# Patient Record
Sex: MALE | Race: OTHER
[De-identification: names, ages, dates, MRNs, and addresses within clinical notes are randomized per-mention and may not be internally consistent; named-entity substitution may affect disease eponyms.]

---

## 2019-03-14 ENCOUNTER — HOSPITAL ENCOUNTER (INPATIENT)
Dept: HOSPITAL 62 - ER | Age: 49
LOS: 18 days | Discharge: HOME HEALTH | DRG: 240 | End: 2019-04-01
Attending: STUDENT IN AN ORGANIZED HEALTH CARE EDUCATION/TRAINING PROGRAM | Admitting: STUDENT IN AN ORGANIZED HEALTH CARE EDUCATION/TRAINING PROGRAM
Payer: SELF-PAY

## 2019-03-14 DIAGNOSIS — D50.0: ICD-10-CM

## 2019-03-14 DIAGNOSIS — B95.1: ICD-10-CM

## 2019-03-14 DIAGNOSIS — E11.52: Primary | ICD-10-CM

## 2019-03-14 DIAGNOSIS — D72.829: ICD-10-CM

## 2019-03-14 DIAGNOSIS — M86.172: ICD-10-CM

## 2019-03-14 DIAGNOSIS — E11.69: ICD-10-CM

## 2019-03-14 DIAGNOSIS — L97.524: ICD-10-CM

## 2019-03-14 DIAGNOSIS — E11.65: ICD-10-CM

## 2019-03-14 DIAGNOSIS — I96: ICD-10-CM

## 2019-03-14 DIAGNOSIS — I10: ICD-10-CM

## 2019-03-14 DIAGNOSIS — E11.621: ICD-10-CM

## 2019-03-14 DIAGNOSIS — Z91.14: ICD-10-CM

## 2019-03-14 DIAGNOSIS — B95.7: ICD-10-CM

## 2019-03-14 DIAGNOSIS — Z79.4: ICD-10-CM

## 2019-03-14 DIAGNOSIS — F10.20: ICD-10-CM

## 2019-03-14 DIAGNOSIS — E87.1: ICD-10-CM

## 2019-03-14 LAB
ABSOLUTE LYMPHOCYTES# (MANUAL): 0.9 10^3/UL (ref 0.5–4.7)
ABSOLUTE MONOCYTES # (MANUAL): 0.7 10^3/UL (ref 0.1–1.4)
ABSOLUTE NEUTROPHILS# (MANUAL): 20.5 10^3/UL (ref 1.7–8.2)
ADD MANUAL DIFF: YES
ALBUMIN SERPL-MCNC: 3 G/DL (ref 3.5–5)
ALP SERPL-CCNC: 138 U/L (ref 38–126)
ALT SERPL-CCNC: 15 U/L (ref 21–72)
ANION GAP SERPL CALC-SCNC: 14 MMOL/L (ref 5–19)
APPEARANCE UR: CLEAR
APTT PPP: YELLOW S
AST SERPL-CCNC: 21 U/L (ref 17–59)
BASOPHILS NFR BLD MANUAL: 0 % (ref 0–2)
BILIRUB DIRECT SERPL-MCNC: 0.8 MG/DL (ref 0–0.4)
BILIRUB SERPL-MCNC: 1.1 MG/DL (ref 0.2–1.3)
BILIRUB UR QL STRIP: NEGATIVE
BUN SERPL-MCNC: 17 MG/DL (ref 7–20)
CALCIUM: 9 MG/DL (ref 8.4–10.2)
CHLORIDE SERPL-SCNC: 87 MMOL/L (ref 98–107)
CK MB SERPL-MCNC: 0.34 NG/ML (ref ?–4.55)
CK SERPL-CCNC: 28 U/L (ref 55–170)
CO2 SERPL-SCNC: 27 MMOL/L (ref 22–30)
EOSINOPHIL NFR BLD MANUAL: 0 % (ref 0–6)
ERYTHROCYTE [DISTWIDTH] IN BLOOD BY AUTOMATED COUNT: 12.2 % (ref 11.5–14)
ETHANOL SERPL-MCNC: < 10 MG/DL
GLUCOSE SERPL-MCNC: 317 MG/DL (ref 75–110)
GLUCOSE UR STRIP-MCNC: >=500 MG/DL
HCT VFR BLD CALC: 36.9 % (ref 37.9–51)
HGB BLD-MCNC: 12.9 G/DL (ref 13.5–17)
KETONES UR STRIP-MCNC: 80 MG/DL
MACROCYTES BLD QL SMEAR: (no result)
MCH RBC QN AUTO: 35.3 PG (ref 27–33.4)
MCHC RBC AUTO-ENTMCNC: 34.9 G/DL (ref 32–36)
MCV RBC AUTO: 101 FL (ref 80–97)
MONOCYTES % (MANUAL): 3 % (ref 3–13)
NITRITE UR QL STRIP: NEGATIVE
PH UR STRIP: 6 [PH] (ref 5–9)
PLATELET # BLD: 364 10^3/UL (ref 150–450)
PLATELET COMMENT: ADEQUATE
POTASSIUM SERPL-SCNC: 4.4 MMOL/L (ref 3.6–5)
PROT SERPL-MCNC: 6.8 G/DL (ref 6.3–8.2)
PROT UR STRIP-MCNC: 30 MG/DL
RBC # BLD AUTO: 3.65 10^6/UL (ref 4.35–5.55)
SEGMENTED NEUTROPHILS % (MAN): 93 % (ref 42–78)
SODIUM SERPL-SCNC: 128.2 MMOL/L (ref 137–145)
SP GR UR STRIP: 1.02
TOTAL CELLS COUNTED BLD: 100
TOXIC GRANULES BLD QL SMEAR: (no result)
TROPONIN I SERPL-MCNC: < 0.012 NG/ML
UROBILINOGEN UR-MCNC: 2 MG/DL (ref ?–2)
VARIANT LYMPHS NFR BLD MANUAL: 4 % (ref 13–45)
WBC # BLD AUTO: 22 10^3/UL (ref 4–10.5)

## 2019-03-14 PROCEDURE — 0Y6U0Z0 DETACHMENT AT LEFT 3RD TOE, COMPLETE, OPEN APPROACH: ICD-10-PCS | Performed by: SURGERY

## 2019-03-14 PROCEDURE — 85027 COMPLETE CBC AUTOMATED: CPT

## 2019-03-14 PROCEDURE — 87070 CULTURE OTHR SPECIMN AEROBIC: CPT

## 2019-03-14 PROCEDURE — 81001 URINALYSIS AUTO W/SCOPE: CPT

## 2019-03-14 PROCEDURE — 80076 HEPATIC FUNCTION PANEL: CPT

## 2019-03-14 PROCEDURE — 82550 ASSAY OF CK (CPK): CPT

## 2019-03-14 PROCEDURE — 71046 X-RAY EXAM CHEST 2 VIEWS: CPT

## 2019-03-14 PROCEDURE — 96375 TX/PRO/DX INJ NEW DRUG ADDON: CPT

## 2019-03-14 PROCEDURE — 0Y6S0Z0 DETACHMENT AT LEFT 2ND TOE, COMPLETE, OPEN APPROACH: ICD-10-PCS | Performed by: SURGERY

## 2019-03-14 PROCEDURE — 88305 TISSUE EXAM BY PATHOLOGIST: CPT

## 2019-03-14 PROCEDURE — 87077 CULTURE AEROBIC IDENTIFY: CPT

## 2019-03-14 PROCEDURE — 87186 SC STD MICRODIL/AGAR DIL: CPT

## 2019-03-14 PROCEDURE — 82140 ASSAY OF AMMONIA: CPT

## 2019-03-14 PROCEDURE — 0Y6W0Z0 DETACHMENT AT LEFT 4TH TOE, COMPLETE, OPEN APPROACH: ICD-10-PCS | Performed by: SURGERY

## 2019-03-14 PROCEDURE — 93005 ELECTROCARDIOGRAM TRACING: CPT

## 2019-03-14 PROCEDURE — 80202 ASSAY OF VANCOMYCIN: CPT

## 2019-03-14 PROCEDURE — 36415 COLL VENOUS BLD VENIPUNCTURE: CPT

## 2019-03-14 PROCEDURE — 83036 HEMOGLOBIN GLYCOSYLATED A1C: CPT

## 2019-03-14 PROCEDURE — 87075 CULTR BACTERIA EXCEPT BLOOD: CPT

## 2019-03-14 PROCEDURE — 88311 DECALCIFY TISSUE: CPT

## 2019-03-14 PROCEDURE — 93010 ELECTROCARDIOGRAM REPORT: CPT

## 2019-03-14 PROCEDURE — 80053 COMPREHEN METABOLIC PANEL: CPT

## 2019-03-14 PROCEDURE — 83735 ASSAY OF MAGNESIUM: CPT

## 2019-03-14 PROCEDURE — 01480 ANES OPEN PX LOWER L/A/F NOS: CPT

## 2019-03-14 PROCEDURE — 83605 ASSAY OF LACTIC ACID: CPT

## 2019-03-14 PROCEDURE — 80307 DRUG TEST PRSMV CHEM ANLYZR: CPT

## 2019-03-14 PROCEDURE — 84484 ASSAY OF TROPONIN QUANT: CPT

## 2019-03-14 PROCEDURE — 80069 RENAL FUNCTION PANEL: CPT

## 2019-03-14 PROCEDURE — 93971 EXTREMITY STUDY: CPT

## 2019-03-14 PROCEDURE — 93926 LOWER EXTREMITY STUDY: CPT

## 2019-03-14 PROCEDURE — 82553 CREATINE MB FRACTION: CPT

## 2019-03-14 PROCEDURE — A6266 IMPREG GAUZE NO H20/SAL/YARD: HCPCS

## 2019-03-14 PROCEDURE — 80048 BASIC METABOLIC PNL TOTAL CA: CPT

## 2019-03-14 PROCEDURE — 85025 COMPLETE CBC W/AUTO DIFF WBC: CPT

## 2019-03-14 PROCEDURE — 87040 BLOOD CULTURE FOR BACTERIA: CPT

## 2019-03-14 PROCEDURE — 96365 THER/PROPH/DIAG IV INF INIT: CPT

## 2019-03-14 PROCEDURE — 88307 TISSUE EXAM BY PATHOLOGIST: CPT

## 2019-03-14 PROCEDURE — 99285 EMERGENCY DEPT VISIT HI MDM: CPT

## 2019-03-14 PROCEDURE — 87205 SMEAR GRAM STAIN: CPT

## 2019-03-14 PROCEDURE — 82962 GLUCOSE BLOOD TEST: CPT

## 2019-03-14 PROCEDURE — 96361 HYDRATE IV INFUSION ADD-ON: CPT

## 2019-03-14 RX ADMIN — FAMOTIDINE SCH MG: 20 TABLET, FILM COATED ORAL at 23:48

## 2019-03-14 RX ADMIN — ENOXAPARIN SODIUM SCH: 40 INJECTION SUBCUTANEOUS at 18:08

## 2019-03-14 RX ADMIN — PIPERACILLIN AND TAZOBACTAM SCH MLS/HR: 4; .5 INJECTION, POWDER, LYOPHILIZED, FOR SOLUTION INTRAVENOUS; PARENTERAL at 18:46

## 2019-03-14 RX ADMIN — KETOROLAC TROMETHAMINE PRN MG: 30 INJECTION, SOLUTION INTRAMUSCULAR at 20:34

## 2019-03-14 RX ADMIN — DIAZEPAM SCH: 5 TABLET ORAL at 18:08

## 2019-03-14 RX ADMIN — DIAZEPAM SCH MG: 5 TABLET ORAL at 23:48

## 2019-03-14 RX ADMIN — SODIUM CHLORIDE, SODIUM LACTATE, POTASSIUM CHLORIDE, AND CALCIUM CHLORIDE PRN MLS/HR: .6; .31; .03; .02 INJECTION, SOLUTION INTRAVENOUS at 13:33

## 2019-03-14 RX ADMIN — OXYCODONE AND ACETAMINOPHEN PRN TAB: 5; 325 TABLET ORAL at 20:33

## 2019-03-14 RX ADMIN — VANCOMYCIN HYDROCHLORIDE SCH MLS/HR: 1 INJECTION, POWDER, LYOPHILIZED, FOR SOLUTION INTRAVENOUS at 23:48

## 2019-03-14 NOTE — PDOC H&P
History of Present Illness


Admission Date/PCP: 


  





  NO LOCALMD





History of Present Illness: 


BRIANDA SNOW is a 48 year old male male patient with past medical history 

of type 2 diabetes mellitus, hypertension, alcoholism presents to ED for left 

foot pain and ulceration.  Patient brought by friends and family members.  

Reportedly patient has left foot ulcer which has been going on for the last 6 

weeks.  Patient denies any fever chills palpitation or diaphoresis.  Patient is 

a known alcoholic and he drinks on daily basis.  Patient is also noncompliant 

with his medication and his hemoglobin A1c 5 years ago was 10.8.  Upon my 

evaluation the patient's left foot is swollen erythematous and the second and 

third toe are gangrenous.  There is also a wound on the metatarsal area.  Dr. Kendall consulted by the ER attending to evaluate the patient in for possible 

incision drainage and total debridement.  I think patient possibly may need 

amputation of the gangrenous toe.








Past Medical History


Cardiac Medical History: Reports: Hypertension





Social History


Smoking Status: Never Smoker





- Advance Directive


Resuscitation Status: Full Code





Family History


Parental Family History Reviewed: Yes


Children Family History Reviewed: Yes


Sibling(s) Family History Reviewed.: Yes





Medication/Allergy


Allergies/Adverse Reactions: 


                                        





No Known Allergies Allergy (Verified 03/14/19 10:15)


   











Review of Systems


Constitutional: ABSENT: chills, fever(s), headache(s), weight gain, weight loss


Eyes: ABSENT: visual disturbances


Ears: ABSENT: hearing changes


Cardiovascular: ABSENT: chest pain, dyspnea on exertion, edema, orthropnea, 

palpitations


Respiratory: ABSENT: cough, hemoptysis


Gastrointestinal: ABSENT: abdominal pain, constipation, diarrhea, hematemesis, 

hematochezia, nausea, vomiting


Genitourinary: ABSENT: dysuria, hematuria


Musculoskeletal: ABSENT: joint swelling


Integumentary: ABSENT: rash, wounds


Neurological: ABSENT: abnormal gait, abnormal speech, confusion, dizziness, fo

devon weakness, syncope


Psychiatric: ABSENT: anxiety, depression, homidical ideation, suicidal ideation


Endocrine: ABSENT: cold intolerance, heat intolerance, polydipsia, polyuria


Hematologic/Lymphatic: ABSENT: easy bleeding, easy bruising





Physical Exam


Vital Signs: 


                                        











Temp Pulse Resp BP Pulse Ox


 


 98.1 F   107 H  16   130/90 H  99 


 


 03/14/19 10:15  03/14/19 10:15  03/14/19 10:15  03/14/19 10:15  03/14/19 10:15








                                 Intake & Output











 03/13/19 03/14/19 03/15/19





 06:59 06:59 06:59


 


Weight   70.3 kg











General appearance: PRESENT: no acute distress


Head exam: PRESENT: atraumatic, normocephalic


Mouth exam: PRESENT: moist


Neck exam: ABSENT: carotid bruit, JVD, lymphadenopathy, thyromegaly


Respiratory exam: PRESENT: clear to auscultation lorenzo.  ABSENT: rales, rhonchi, 

wheezes


Cardiovascular exam: PRESENT: RRR.  ABSENT: diastolic murmur, rubs, systolic 

murmur


Extremities exam: PRESENT: other - The left foot is erythematous swollen and 

macerated with gangrenous





Results


Laboratory Results: 


                                        





                                 03/14/19 11:00 





                                 03/14/19 11:00 





                                        











  03/14/19 03/14/19 03/14/19





  11:00 11:00 12:10


 


WBC  22.0 H  


 


RBC  3.65 L  


 


Hgb  12.9 L  


 


Hct  36.9 L  


 


MCV  101 H  


 


MCH  35.3 H  


 


MCHC  34.9  


 


RDW  12.2  


 


Plt Count  364  


 


Seg Neutrophils %  Not Reportable  


 


Lymphocytes %  Not Reportable  


 


Monocytes %  Not Reportable  


 


Eosinophils %  Not Reportable  


 


Basophils %  Not Reportable  


 


Absolute Neutrophils  Not Reportable  


 


Absolute Lymphocytes  Not Reportable  


 


Absolute Monocytes  Not Reportable  


 


Absolute Eosinophils  Not Reportable  


 


Absolute Basophils  Not Reportable  


 


Sodium   128.2 L 


 


Potassium   4.4 


 


Chloride   87 L 


 


Carbon Dioxide   27 


 


Anion Gap   14 


 


BUN   17 


 


Creatinine   0.83 


 


Est GFR ( Amer)   > 60 


 


Est GFR (Non-Af Amer)   > 60 


 


Glucose   317 H 


 


Lactic Acid    1.6


 


Calcium   9.0 


 


Total Bilirubin   1.1 


 


AST   21 


 


ALT   15 L 


 


Alkaline Phosphatase   138 H 


 


Total Protein   6.8 


 


Albumin   3.0 L 


 


Urine Color   


 


Urine Appearance   


 


Urine pH   


 


Ur Specific Gravity   


 


Urine Protein   


 


Urine Glucose (UA)   


 


Urine Ketones   


 


Urine Blood   


 


Urine Nitrite   


 


Ur Leukocyte Esterase   


 


Urine WBC (Auto)   


 


Urine RBC (Auto)   














  03/14/19





  12:10


 


WBC 


 


RBC 


 


Hgb 


 


Hct 


 


MCV 


 


MCH 


 


MCHC 


 


RDW 


 


Plt Count 


 


Seg Neutrophils % 


 


Lymphocytes % 


 


Monocytes % 


 


Eosinophils % 


 


Basophils % 


 


Absolute Neutrophils 


 


Absolute Lymphocytes 


 


Absolute Monocytes 


 


Absolute Eosinophils 


 


Absolute Basophils 


 


Sodium 


 


Potassium 


 


Chloride 


 


Carbon Dioxide 


 


Anion Gap 


 


BUN 


 


Creatinine 


 


Est GFR ( Amer) 


 


Est GFR (Non-Af Amer) 


 


Glucose 


 


Lactic Acid 


 


Calcium 


 


Total Bilirubin 


 


AST 


 


ALT 


 


Alkaline Phosphatase 


 


Total Protein 


 


Albumin 


 


Urine Color  YELLOW


 


Urine Appearance  CLEAR


 


Urine pH  6.0


 


Ur Specific Gravity  1.021


 


Urine Protein  30 H


 


Urine Glucose (UA)  >=500 H


 


Urine Ketones  80 H


 


Urine Blood  SMALL H


 


Urine Nitrite  NEGATIVE


 


Ur Leukocyte Esterase  TRACE H


 


Urine WBC (Auto)  6


 


Urine RBC (Auto)  5








                                        











  03/14/19 03/14/19





  11:00 11:00


 


Creatine Kinase  28 L 


 


CK-MB (CK-2)   0.34


 


Troponin I   < 0.012











Impressions: 


                                        





Chest X-Ray  03/14/19 11:26


IMPRESSION:  DIFFUSE INTERSTITIAL SCARRING.  NO ACUTE RADIOGRAPHIC FINDING IN 

THE CHEST.


 








Foot X-Ray  03/14/19 11:28


IMPRESSION:  SUPERFICIAL SOFT TISSUE ULCERATION ON THE LATERAL IMAGE.  ON THE AP

AND OBLIQUE IMAGES THERE IS PATCHY OSTEOPENIA AND INDISTINCT APPEARANCE OF THE 

CORTEX AT THE BASE OF THE PROXIMAL PHALANX OF THE 3RD AND 4TH TOE.  SIMILAR 

APPEARANCE INVOLVING THE HEAD OF THE 3RD AND 4TH METATARSALS AND POSSIBLY THE 

2ND.  FINDINGS SUSPICIOUS FOR OSTEOMYELITIS.  FURTHER EVALUATION WITH MRI OF THE

FOOT MAY BE INDICATED.


 














Assessment & Plan





- Diagnosis


(1) Left infected gangrenous foot


Is this a current diagnosis for this admission?: Yes   


Plan: 


Second and third left toe looks it has with gangrene.


Dr. Kendall consulted.


We will keep him on IV vancomycin and Zosyn.








(2) Leukocytosis


Is this a current diagnosis for this admission?: Yes   


Plan: 


Due to #1.  Will monitor CBC.








(3) Uncontrolled diabetes mellitus


Qualifiers: 


   Diabetes mellitus type: type 2 


Is this a current diagnosis for this admission?: Yes   


Plan: 


We will start him on Lantus and sliding scale.








(4) Alcoholism /alcohol abuse


Is this a current diagnosis for this admission?: Yes   


Plan: 


I will put him on alcohol withdrawal protocol.








(5) Hypertension


Qualifiers: 


   Hypertension type: essential hypertension   Qualified Code(s): I10 - 

Essential (primary) hypertension   


Is this a current diagnosis for this admission?: Yes   


Plan: 


I will start him on metoprolol








- Inpatient Certification


Medical Necessity: Need Close Monitoring Due to Risk of Patient Decompensation, 

Need for IV Antibiotics

## 2019-03-14 NOTE — OPERATIVE REPORT
Operative Report


DATE OF SURGERY: 03/14/19


PREOPERATIVE DIAGNOSIS: Septic diabetic left foot with ischemic toes 2 and 3


POSTOPERATIVE DIAGNOSIS: Same with aggressive soft tissue infection, 

osteomyelitis of toes 2 3 and 4, with extension of infection to the dorsum of 

the left foot


OPERATION: Aggressive debridement of left foot including amputations of toes 2 3

and 4, metatarsal heads of 2 3 and 4, counterincision dorsum of left foot with 

Penrose loop drain insertion


SURGEON: BEHZAD PEDRO


ANESTHESIA: GA


TISSUE REMOVED OR ALTERED: Toes 2, 3, 4, skin, fascia, tendons, bone chunks


COMPLICATIONS: 





None


ESTIMATED BLOOD LOSS: 25 cc


INTRAOPERATIVE FINDINGS: See below


PROCEDURE: 





The patient was taken to the operating where general anesthesia was induced.  

Left foot was then isolated, prepped and draped sterile fashion.





Surgical plan surgical timeout were conducted.





The foot was significant for obvious infection, and ischemia involving toes 2 3 

and 4 on the left side, with extension to the plantar surface metatarsal areas 2

and 3, and the dorsum of the midfoot.  The fourth toe was considered marginal 

status.





Using a 10 blade, the skin and subcutaneous tissue, fascia, and webspace of the 

toes 2 and 3 incised.  Toes 2 and 3 were removed using bone cutter.  

Visualization of the medial aspect of the fourth toe confirmed its non-viability

so the fourth toe was amputated as well.  Metatarsal heads were taken down with 

a large bone rondure in the second, third and fourth metatarsal positions.  A 

counterincision was made on the dorsum of the foot the mid foot-out of tarsal 

area, longitudinally oriented.  More pus was evacuated.  Of note pus was noted 

around toes 2 3 as well as the plantar surface of the foot; cultures were sent 

for Gram stain and sensitivity.





A large Penrose drain was then placed between the counterincision and the 

initial amputation incision.  I used a Ella clamp to break up any loculations 

under the skin flaps around the counterincision.





The lateral left fifth metatarsal area was bruised but not ischemic.  I probed 

this area with Ella clamp and I was not able to introduce a pocket therefore it

was left as is.





I now irrigated the wounds with 3 L of pulse lavage irrigation, normal saline.  

Small bleeders likely digital vessels cauterized as encountered.





At this point I felt that I had done a sufficient level of aggressive damage 

control debridement of this threatened forefoot.  Several feet of half inch 

iodoform packing was inserted into the open wound, and then a 4 x 4's Kerlix and

Ace wrap applied.  Patient taught procedure well, extubated, and taken to 

recovery in stable condition.

## 2019-03-14 NOTE — RADIOLOGY REPORT (SQ)
EXAM DESCRIPTION:  CHEST 2 VIEWS



COMPLETED DATE/TIME:  3/14/2019 12:06 pm



REASON FOR STUDY:  left lower leg swelling with erythema/ulceration



COMPARISON:  5/28/2010.



EXAM PARAMETERS:  NUMBER OF VIEWS: two views

TECHNIQUE: Digital Frontal and Lateral radiographic views of the chest acquired.

RADIATION DOSE: NA

LIMITATIONS: none



FINDINGS:  LUNGS AND PLEURA: Diffuse interstitial changes are again seen and slightly more prominent.
  No lobar infiltrates, masses or pneumothorax. No pleural effusion.

MEDIASTINUM AND HILAR STRUCTURES: No masses or contour abnormalities.

HEART AND VASCULAR STRUCTURES: Heart normal size.  No evidence for failure.

BONES: No acute findings.

HARDWARE: None in the chest.

OTHER: No other significant finding.



IMPRESSION:  DIFFUSE INTERSTITIAL SCARRING.  NO ACUTE RADIOGRAPHIC FINDING IN THE CHEST.



TECHNICAL DOCUMENTATION:  JOB ID:  [id number]

 2011 [employer]- All Rights Reserved



Reading location - IP/workstation name: [email or web address]

## 2019-03-14 NOTE — XCELERA REPORT
[address] [place]

                             [facility] [address]

                               Tel: [phone]

                               Fax: [phone]



                       Lower Extremity Venous Evaluation

_______________________________________________________________________________



_______________________________________________________________________________

Procedure: Color flow and duplex imaging of the veins of the left lower

extremity as well as the right Common Femoral vein.





_______________________________________________________________________________



_______________________________________________________________________________



Right Sided Venous Evaluation

The right common femoral vein is fully compressible. Spontaneous and phasic

flow is present in the right common femoral vein.



Left Sided Venous Evaluation

Normal vessel filling wall to wall, compression and augmentation as well as

Colour flow down to the infrageniculate veins.



_______________________________________________________________________________



Interpretation Summary

No duplex evidence of DVT or obstruction in the left lower extremity nor in

the right Common Femoral vein.

_______________________________________________________________________________



Name: BRIANDA SNOW

MRN: [id number]

Age: 48 yrs

Gender: Male                                      : 1970

Patient Status: Inpatient                         Patient Location:

[place] [address]A

Account #: [id number]

Study Date: 2019 12:40 PM

                          Accession #: [id number]

Reason For Study: eft lower leg swelling with erythema/ulceration

Ordering Physician: ZACARIAS MONTANO

Performed By: Shakira Lockhart

Electronically signed by:      Lennox Williams      on 2019 02:22 PM



CC: ZACARIAS MONTANO

>

Williams, Lennox

## 2019-03-14 NOTE — ER DOCUMENT REPORT
ED General





- General


Chief Complaint: Foot Pain


Stated Complaint: FOOT PAIN


Mode of Arrival: Ambulatory


Information source: Patient, Friend


TRAVEL OUTSIDE OF THE [place] IN LAST 30 DAYS: No





- HPI


Notes: 





48-year-old male who is a noncompliant diabetic presents to the ED with a friend

for evaluation of his left foot that he is concerned about infection due to 

ulcerated sores, black discoloration and severe pain.  Patient primarily speaks 

Kyrgyz but does understand English and able to speak English.  patient and 

family friend states that his sores on his foot started roughly 6 weeks ago, 

patient had complained about them but he did not feel any pain.  Reports a sore 

started a sore on the bottom of his left foot that has increased in size with 

noted swelling along with sores between his left toes.  family friend is with 

patient, currently at bedside, called Dr. Velazquez, local family medicine physician,

who advised him to come to the emergency room considering his last blood work 

which was over 5 years ago was A1c was 10.8 and his blood sugar was 398.   

Family friend states that patient has been drinking heavily in the last couple 

of years.  Patient states that he no longer ambulate on his own.  denies fevers,

chills,  chest pain,palpitations,  shortness of breath, dyspnea, nausea, 

vomiting, diarrhea, abdominal pain, hematuria,LH, dizziness, syncope, headaches,

URI, neck pain, weakness, bowel or bladder dysfunction, saddle anesthesia, 

muscle paralysis, weakness in bilateral upper or lower extremities equally or 

rash.





- Related Data


Allergies/Adverse Reactions: 


                                        





No Known Allergies Allergy (Verified 03/14/19 10:15)


   











Past Medical History





- General


Information source: Patient





- Social History


Smoking Status: Never Smoker


Chew tobacco use (# tins/day): No


Frequency of alcohol use: Occasional


Drug Abuse: None


Family History: Reviewed & Not Pertinent


Patient has suicidal ideation: No


Patient has homicidal ideation: No





- Past Medical History


Cardiac Medical History: Reports: Hx Hypertension


Renal/ Medical History: Denies: Hx Peritoneal Dialysis





Review of Systems





- Review of Systems


Constitutional: See HPI


EENT: No symptoms reported


Cardiovascular: No symptoms reported


Respiratory: No symptoms reported


Gastrointestinal: No symptoms reported


Genitourinary: No symptoms reported


Male Genitourinary: No symptoms reported


Musculoskeletal: See HPI


Skin: See HPI


Hematologic/Lymphatic: No symptoms reported


Neurological/Psychological: No symptoms reported





Physical Exam





- Vital signs


Vitals: 


                                        











Temp Pulse Resp BP Pulse Ox


 


 98.1 F   107 H  16   130/90 H  99 


 


 03/14/19 10:15  03/14/19 10:15  03/14/19 10:15  03/14/19 10:15  03/14/19 10:15














- Notes


Notes: 





PHYSICAL EXAMINATION:





GENERAL: Well-appearing, well-nourished and in no acute distress.





HEAD: Atraumatic, normocephalic.





EYES: Pupils equal round and reactive to light, extraocular movements intact, 

sclera anicteric, conjunctiva are normal.





ENT: Nares patent, oropharynx clear without exudates.  Moist mucous membranes.





NECK: Normal range of motion, supple without lymphadenopathy





LUNGS: Breath sounds clear to auscultation bilaterally and equal.  No wheezes 

rales or rhonchi.





HEART: Regular rate and rhythm without murmurs





ABDOMEN: Soft, nontender, nondistended abdomen.  No guarding, no rebound.  No ma

sses appreciated.





Musculoskeletal: Normal range of motion, no pitting or edema.  No cyanosis.





NEUROLOGICAL: Cranial nerves grossly intact.  Normal speech, normal gait.  

Normal sensory, motor exams 





PSYCH: Normal mood, normal affect.





SKIN: Warm, Dry, normal turgor, no rashes or lesions noted.  2nd and 3rd Left 

toes with black discoloration with swelling, erythema and warmth to touch on 

dorsal aspect of left foot with decubitus ulcer to proximal plantar aspect with 

non-blanching white discoloration.  unable to palpate distal pulses on the left,

able to palpate distal pulses on right.  able to wiggle toes on his left foot. 

negative squeeze test bilaterally, negative jonathan's test bilaterally. 





- General


General appearance: Alert





Course





- Re-evaluation


Re-evalutation: 





03/14/19 16:21


48-year-old male with afebrile slightly tachycardic and in moderate pain 

presents with friend for evaluation of foot swelling, ulceration and 

discoloration that was brought to friend's attention today, patient has been 

battling these sores per family friend for the last 6 weeks.  CBC shows a 

leukocytosis of 22 with was related brown like you likely do not think he has 

like there may come all the underlying no left shift, lactic is 1.6, sodium 128,

potassium, renal function within normal limits, slightly elevated alk phos which

is to be expected considering patient's diabetic foot ulcers and condition of 

the foot.  Discussed laboratory findings with Dr. Jeff Garrett, who advised 

to monitor pt and hyponatremia since he is asymptomatic.   Venous/arterial 

studies of left lower extremity negative for occlusion.  Patient started on IV 

vancomycin and Zosyn, IV fluids, patient given IV pain control.  X-ray left foot

showed osteomyelitis intermetatarsal joints, no gas noted.  Dr. Padmini clark, hospitalist, will admit patient to the surgical floor with surgical 

consult, Dr. Maynor Godwin, surgeon, at bedside at 1400, will bring patient 

to OR for likely amputation of the affected metatarsal joints, does not feel 

that an MRI is necessar prior.   Patient pain control has been managed.  On 

multiple re-evaluations patient has remained stable, vitals have been stable and

patient is in no distress.  Patient verbalized understanding of the plan that 

has before him of surgery and admission, all questions and concerns have been 

answered by this provider.  Patient will be admitted to hospitalist service with

surgical consult





- Vital Signs


Vital signs: 


                                        











Temp Pulse Resp BP Pulse Ox


 


 100.2 F   110 H  18   139/66 H  96 


 


 03/14/19 14:59  03/14/19 14:59  03/14/19 14:59  03/14/19 14:59  03/14/19 14:59














- Laboratory


Result Diagrams: 


                                 03/14/19 11:00





                                 03/14/19 11:00


Laboratory results interpreted by me: 


                                        











  03/14/19 03/14/19 03/14/19





  10:54 11:00 11:00


 


WBC   22.0 H 


 


RBC   3.65 L 


 


Hgb   12.9 L 


 


Hct   36.9 L 


 


MCV   101 H 


 


MCH   35.3 H 


 


Seg Neuts % (Manual)   93 H 


 


Lymphocytes % (Manual)   4 L 


 


Abs Neuts (Manual)   20.5 H 


 


Sodium    128.2 L


 


Chloride    87 L


 


Glucose    317 H


 


POC Glucose  330 H  


 


Direct Bilirubin    0.8 H


 


ALT    15 L


 


Alkaline Phosphatase    138 H


 


Ammonia   


 


Creatine Kinase    28 L


 


Albumin    3.0 L


 


Urine Protein   


 


Urine Glucose (UA)   


 


Urine Ketones   


 


Urine Blood   


 


Urine Urobilinogen   


 


Ur Leukocyte Esterase   














  03/14/19 03/14/19





  12:10 13:57


 


WBC  


 


RBC  


 


Hgb  


 


Hct  


 


MCV  


 


MCH  


 


Seg Neuts % (Manual)  


 


Lymphocytes % (Manual)  


 


Abs Neuts (Manual)  


 


Sodium  


 


Chloride  


 


Glucose  


 


POC Glucose  


 


Direct Bilirubin  


 


ALT  


 


Alkaline Phosphatase  


 


Ammonia   < 8.7 L


 


Creatine Kinase  


 


Albumin  


 


Urine Protein  30 H 


 


Urine Glucose (UA)  >=500 H 


 


Urine Ketones  80 H 


 


Urine Blood  SMALL H 


 


Urine Urobilinogen  2.0 H 


 


Ur Leukocyte Esterase  TRACE H 














Discharge





- Discharge


Clinical Impression: 


 Osteomyelitis of left foot, Leukocytosis, Left infected gangrenous foot, 

Alcoholism /alcohol abuse





Uncontrolled diabetes mellitus


Qualifiers:


 Diabetes mellitus type: type 2 





Condition: Stable


Disposition: ADMITTED AS INPATIENT


Admitting Provider: Hospitalist - Dr. Langford

## 2019-03-14 NOTE — XCELERA REPORT
[address] [address]

                               Tel: [phone]

                               Fax: [phone]



                      Lower Extremity Arterial Evaluation

_______________________________________________________________________________



Name: BRIANDA SNOW

MRN: [id number]                                   Age: 48 yrs

Gender: Male                                      : 1970

Patient Status: Inpatient                         Patient Location:

[place] [address]A

Account #: [id number]

Study Date: 2019 12:21 PM

                          Accession #: [id number]

_______________________________________________________________________________

Procedure: A color flow and duplex scan of the lower extremity arteries was

performed on the left with velocity and waveform anaylsis.

Reason For Study: necrotic foot ulcers, diminished pedal pulses LLE







Ordering Physician: ZACARIAS MONTANO

Performed By: Shakira Lockhart

_______________________________________________________________________________

_______________________________________________________________________________





Measurements and Calculations



                                     Right  Left

                     CFA PSV               184.6 cm/sec

                     Prox PFA PSV          -125.7cm/sec

                     Prox SFA PSV          -134.1cm/sec

                     Mid SFA PSV           -146.9cm/sec

                     Dist SFA PSV          -108.1cm/sec

                     Dist Pop A PSV        108.1 cm/sec

                     Dist JOHANA PSV          213.1 cm/sec

                     Dist PTA PSV          116.9 cm/sec

                     Tomer Pedis PSV         149.4 cm/sec



_______________________________________________________________________________



Left Side Arterial Evaluation

Normal velocity and triphasic waveforms in the Common Femoral artery. .

Biphasic with normal velocity with spectral broadening From Femoral to the

Posterior tibial. Monophasic with normal velocity, spectral broadening in the

Anterior Tibial artery.

Ankle Brachial index not obtained due to pain.



_______________________________________________________________________________

Interpretation Summary

Moderate hemodynamically significant lesions in the left lower extremity only,

on duplex imaging, at rest. Hemodynamic effects from the Femoral, sequential

changes in the Anterior Tibial.

_______________________________________________________________________________

Electronically signed by:      Lennox Williams      on 2019 02:27 PM



CC: ZACARIAS MONTANO

>

Williams, Lennox

## 2019-03-14 NOTE — RADIOLOGY REPORT (SQ)
EXAM DESCRIPTION:  FOOT LEFT COMPLETE



COMPLETED DATE/TIME:  3/14/2019 12:22 pm



REASON FOR STUDY:  ulceration to left foot



COMPARISON:  None.



NUMBER OF VIEWS:  Three views.



TECHNIQUE:  AP, lateral and oblique  radiographic images acquired of the left foot.



LIMITATIONS:  None.



FINDINGS:  MINERALIZATION: Normal.

BONES: No acute fracture or dislocation.  Patchy osteopenia at the base of the proximal phalanx of th
e 4th and 5th toe and also involving the head of the 3rd and 4th metatarsals and possibly the 2nd.  I
ndistinct appearance of the cortex.

JOINTS: Degenerative changes in the 1st metatarsal phalangeal joint.

SOFT TISSUES: On the lateral image there is evidence of a superficial soft tissue ulceration on the p
lantar surface at the base of the toes.  No foreign body.

OTHER: No other significant finding.



IMPRESSION:  SUPERFICIAL SOFT TISSUE ULCERATION ON THE LATERAL IMAGE.  ON THE AP AND OBLIQUE IMAGES T
HERE IS PATCHY OSTEOPENIA AND INDISTINCT APPEARANCE OF THE CORTEX AT THE BASE OF THE PROXIMAL PHALANX
 OF THE 3RD AND 4TH TOE.  SIMILAR APPEARANCE INVOLVING THE HEAD OF THE 3RD AND 4TH METATARSALS AND PO
SSIBLY THE 2ND.  FINDINGS SUSPICIOUS FOR OSTEOMYELITIS.  FURTHER EVALUATION WITH MRI OF THE FOOT MAY 
BE INDICATED.



TECHNICAL DOCUMENTATION:  JOB ID:  [id number]

 2011 [email or web address]- All Rights Reserved



Reading location - IP/workstation name: [email or web address]

## 2019-03-14 NOTE — PDOC CONSULTATION
Consultation


Consult Date: 03/14/19


Attending physician:: luís


Consult reason:: Septic left foot





History of Present Illness


Patient complains of: Foot pain


History of Present Illness: 


BRIANDA SNOW is a 48 year old male


Who presents emergency department via ground rescue complaining of several day 

history of a left foot pain, swelling, drainage.  He was told by his friend at 

work to seek medical care.  He is in the emergency department this morning with 

a septic appearing left foot.  X-rays show findings consistent with 

osteomyelitis and soft tissue ulceration.  Surgery was consulted, patient 

evaluated and found to have a threatened forefoot in need of urgent operative 

debridement.





Past Medical History


Past Medical History: 





Diabetes and alcoholism


Cardiac Medical History: Reports: Hypertension





Past Surgical History


Past Surgical History: 





None known





Social History


Smoking Status: Never Smoker





- Advance Directive


Resuscitation Status: Full Code





Family History


Family History: None


Parental Family History Reviewed: No


Children Family History Reviewed: No


Sibling(s) Family History Reviewed.: No





Medication/Allergy


Allergies/Adverse Reactions: 


                                        





No Known Allergies Allergy (Verified 03/14/19 10:15)


   











Review of Systems


ROS unobtainable: Other - Due to language barrier unable to complete thorough 

review of systems





Physical Exam


Vital Signs: 


                                        











Temp Pulse Resp BP Pulse Ox


 


 98.1 F   107 H  16   130/90 H  99 


 


 03/14/19 10:15  03/14/19 10:15  03/14/19 10:15  03/14/19 10:15  03/14/19 10:15








                                 Intake & Output











 03/13/19 03/14/19 03/15/19





 06:59 06:59 06:59


 


Weight   70.3 kg











General appearance: PRESENT: mild distress


Head exam: PRESENT: normocephalic


Eye exam: PRESENT: EOMI


Mouth exam: PRESENT: dry mucosa


Neck exam: PRESENT: full ROM


Respiratory exam: PRESENT: clear to auscultation lorenzo


Cardiovascular exam: PRESENT: RRR


Pulses: PRESENT: normal carotid pulses, normal radial pulses, normal femoral 

pulses, other - Unable to feel pulses in left foot


Vascular exam: PRESENT: other - Able to feel pulses in the right foot, posterior

tibial and dorsalis pedis


GI/Abdominal exam: PRESENT: soft


Rectal exam: PRESENT: deferred


Extremities exam: PRESENT: other - Grossly swollen erythematous left foot, with 

ischemic toes 2 and 3, mild edema toes 4 and 5 and 1; deep V-shaped necrotic 

area of skin on the plantar surface; left fourth mal perforans ulcer


Psychiatric exam: PRESENT: appropriate affect


Skin exam: PRESENT: dry





Results


Laboratory Results: 


                                        





                                 03/14/19 11:00 





                                 03/14/19 11:00 





                                        











  03/14/19 03/14/19 03/14/19





  11:00 11:00 12:10


 


WBC  22.0 H  


 


RBC  3.65 L  


 


Hgb  12.9 L  


 


Hct  36.9 L  


 


MCV  101 H  


 


MCH  35.3 H  


 


MCHC  34.9  


 


RDW  12.2  


 


Plt Count  364  


 


Seg Neutrophils %  Not Reportable  


 


Lymphocytes %  Not Reportable  


 


Monocytes %  Not Reportable  


 


Eosinophils %  Not Reportable  


 


Basophils %  Not Reportable  


 


Absolute Neutrophils  Not Reportable  


 


Absolute Lymphocytes  Not Reportable  


 


Absolute Monocytes  Not Reportable  


 


Absolute Eosinophils  Not Reportable  


 


Absolute Basophils  Not Reportable  


 


Sodium   128.2 L 


 


Potassium   4.4 


 


Chloride   87 L 


 


Carbon Dioxide   27 


 


Anion Gap   14 


 


BUN   17 


 


Creatinine   0.83 


 


Est GFR ( Amer)   > 60 


 


Est GFR (Non-Af Amer)   > 60 


 


Glucose   317 H 


 


Lactic Acid    1.6


 


Calcium   9.0 


 


Total Bilirubin   1.1 


 


AST   21 


 


ALT   15 L 


 


Alkaline Phosphatase   138 H 


 


Total Protein   6.8 


 


Albumin   3.0 L 


 


Urine Color   


 


Urine Appearance   


 


Urine pH   


 


Ur Specific Gravity   


 


Urine Protein   


 


Urine Glucose (UA)   


 


Urine Ketones   


 


Urine Blood   


 


Urine Nitrite   


 


Ur Leukocyte Esterase   


 


Urine WBC (Auto)   


 


Urine RBC (Auto)   














  03/14/19





  12:10


 


WBC 


 


RBC 


 


Hgb 


 


Hct 


 


MCV 


 


MCH 


 


MCHC 


 


RDW 


 


Plt Count 


 


Seg Neutrophils % 


 


Lymphocytes % 


 


Monocytes % 


 


Eosinophils % 


 


Basophils % 


 


Absolute Neutrophils 


 


Absolute Lymphocytes 


 


Absolute Monocytes 


 


Absolute Eosinophils 


 


Absolute Basophils 


 


Sodium 


 


Potassium 


 


Chloride 


 


Carbon Dioxide 


 


Anion Gap 


 


BUN 


 


Creatinine 


 


Est GFR ( Amer) 


 


Est GFR (Non-Af Amer) 


 


Glucose 


 


Lactic Acid 


 


Calcium 


 


Total Bilirubin 


 


AST 


 


ALT 


 


Alkaline Phosphatase 


 


Total Protein 


 


Albumin 


 


Urine Color  YELLOW


 


Urine Appearance  CLEAR


 


Urine pH  6.0


 


Ur Specific Gravity  1.021


 


Urine Protein  30 H


 


Urine Glucose (UA)  >=500 H


 


Urine Ketones  80 H


 


Urine Blood  SMALL H


 


Urine Nitrite  NEGATIVE


 


Ur Leukocyte Esterase  TRACE H


 


Urine WBC (Auto)  6


 


Urine RBC (Auto)  5








                                        











  03/14/19 03/14/19





  11:00 11:00


 


Creatine Kinase  28 L 


 


CK-MB (CK-2)   0.34


 


Troponin I   < 0.012











Impressions: 


                                        





Chest X-Ray  03/14/19 11:26


IMPRESSION:  DIFFUSE INTERSTITIAL SCARRING.  NO ACUTE RADIOGRAPHIC FINDING IN 

THE CHEST.


 








Foot X-Ray  03/14/19 11:28


IMPRESSION:  SUPERFICIAL SOFT TISSUE ULCERATION ON THE LATERAL IMAGE.  ON THE AP

AND OBLIQUE IMAGES THERE IS PATCHY OSTEOPENIA AND INDISTINCT APPEARANCE OF THE 

CORTEX AT THE BASE OF THE PROXIMAL PHALANX OF THE 3RD AND 4TH TOE.  SIMILAR 

APPEARANCE INVOLVING THE HEAD OF THE 3RD AND 4TH METATARSALS AND POSSIBLY THE 

2ND.  FINDINGS SUSPICIOUS FOR OSTEOMYELITIS.  FURTHER EVALUATION WITH MRI OF THE

FOOT MAY BE INDICATED.


 














Assessment & Plan





- Diagnosis


(1) Left infected gangrenous foot


Is this a current diagnosis for this admission?: Yes   


Plan: 


Impression: Threatened left foot due to septic diabetic soft tissue and likely 

bone infection, with left toes 2 and 3 nonviable








Recommendations:





1.  Take patient to the operating room for emergent debridement of left foot 

including left toes, and possibly portion of forefoot; subsequent operations may

be required; foot is in jeopardy.  I discussed this with the patient; I am 

uncertain as to his level of understanding





2.  Patient will be admitted to the hospital service with surgery consulting.  

Currently being delivered.








(2) Alcoholism /alcohol abuse


Is this a current diagnosis for this admission?: Yes   





(3) Hypertension


Qualifiers: 


   Hypertension type: essential hypertension   Qualified Code(s): I10 - 

Essential (primary) hypertension   


Is this a current diagnosis for this admission?: Yes   





(4) Uncontrolled diabetes mellitus


Qualifiers: 


   Diabetes mellitus type: type 2 


Is this a current diagnosis for this admission?: Yes   





- Time


Time Spent: 30 to 50 Minutes


Smoking Cessation Education: 3 to 10 minutes


Medications reviewed and adjusted accordingly: Yes


Anticipated discharge: Home





- Inpatient Certification


Based on my medical assessment, after consideration of the patient's c

omorbidities, presenting symptoms, or acuity I expect that the services needed 

warrant INPATIENT care.: Yes


I certify that my determination is in accordance with my understanding of 

Medicare's requirements for reasonable and necessary INPATIENT services [42 CFR 

412.3e].: Yes


Medical Necessity: Need For IV Fluids, Need for Pain Control, Need for IV 

Antibiotics, Need for Surgery

## 2019-03-15 LAB
ADD MANUAL DIFF: NO
ANION GAP SERPL CALC-SCNC: 13 MMOL/L (ref 5–19)
BASOPHILS # BLD AUTO: 0 10^3/UL (ref 0–0.2)
BASOPHILS NFR BLD AUTO: 0.2 % (ref 0–2)
BUN SERPL-MCNC: 17 MG/DL (ref 7–20)
CALCIUM: 8.5 MG/DL (ref 8.4–10.2)
CHLORIDE SERPL-SCNC: 93 MMOL/L (ref 98–107)
CO2 SERPL-SCNC: 28 MMOL/L (ref 22–30)
EOSINOPHIL # BLD AUTO: 0.1 10^3/UL (ref 0–0.6)
EOSINOPHIL NFR BLD AUTO: 0.3 % (ref 0–6)
ERYTHROCYTE [DISTWIDTH] IN BLOOD BY AUTOMATED COUNT: 12.5 % (ref 11.5–14)
GLUCOSE SERPL-MCNC: 248 MG/DL (ref 75–110)
HCT VFR BLD CALC: 34.8 % (ref 37.9–51)
HGB BLD-MCNC: 12 G/DL (ref 13.5–17)
LYMPHOCYTES # BLD AUTO: 1 10^3/UL (ref 0.5–4.7)
LYMPHOCYTES NFR BLD AUTO: 5.9 % (ref 13–45)
MCH RBC QN AUTO: 35 PG (ref 27–33.4)
MCHC RBC AUTO-ENTMCNC: 34.5 G/DL (ref 32–36)
MCV RBC AUTO: 101 FL (ref 80–97)
MONOCYTES # BLD AUTO: 1.2 10^3/UL (ref 0.1–1.4)
MONOCYTES NFR BLD AUTO: 7.3 % (ref 3–13)
NEUTROPHILS # BLD AUTO: 14.4 10^3/UL (ref 1.7–8.2)
NEUTS SEG NFR BLD AUTO: 86.3 % (ref 42–78)
PLATELET # BLD: 372 10^3/UL (ref 150–450)
POTASSIUM SERPL-SCNC: 4 MMOL/L (ref 3.6–5)
RBC # BLD AUTO: 3.44 10^6/UL (ref 4.35–5.55)
SODIUM SERPL-SCNC: 133.7 MMOL/L (ref 137–145)
TOTAL CELLS COUNTED % (AUTO): 100 %
WBC # BLD AUTO: 16.6 10^3/UL (ref 4–10.5)

## 2019-03-15 RX ADMIN — PIPERACILLIN AND TAZOBACTAM SCH MLS/HR: 4; .5 INJECTION, POWDER, LYOPHILIZED, FOR SOLUTION INTRAVENOUS; PARENTERAL at 12:02

## 2019-03-15 RX ADMIN — INSULIN HUMAN SCH UNIT: 100 INJECTION, SUSPENSION SUBCUTANEOUS at 17:25

## 2019-03-15 RX ADMIN — KETOROLAC TROMETHAMINE PRN MG: 30 INJECTION, SOLUTION INTRAMUSCULAR at 04:38

## 2019-03-15 RX ADMIN — ENOXAPARIN SODIUM SCH MG: 40 INJECTION SUBCUTANEOUS at 09:45

## 2019-03-15 RX ADMIN — FAMOTIDINE SCH MG: 20 TABLET, FILM COATED ORAL at 22:39

## 2019-03-15 RX ADMIN — OXYCODONE AND ACETAMINOPHEN PRN TAB: 5; 325 TABLET ORAL at 17:27

## 2019-03-15 RX ADMIN — VANCOMYCIN HYDROCHLORIDE SCH MLS/HR: 1 INJECTION, POWDER, LYOPHILIZED, FOR SOLUTION INTRAVENOUS at 09:47

## 2019-03-15 RX ADMIN — PIPERACILLIN AND TAZOBACTAM SCH MLS/HR: 4; .5 INJECTION, POWDER, LYOPHILIZED, FOR SOLUTION INTRAVENOUS; PARENTERAL at 05:47

## 2019-03-15 RX ADMIN — PIPERACILLIN AND TAZOBACTAM SCH MLS/HR: 4; .5 INJECTION, POWDER, LYOPHILIZED, FOR SOLUTION INTRAVENOUS; PARENTERAL at 17:25

## 2019-03-15 RX ADMIN — DIAZEPAM SCH MG: 5 TABLET ORAL at 13:26

## 2019-03-15 RX ADMIN — DIAZEPAM SCH MG: 5 TABLET ORAL at 05:47

## 2019-03-15 RX ADMIN — FAMOTIDINE SCH MG: 20 TABLET, FILM COATED ORAL at 09:46

## 2019-03-15 RX ADMIN — PIPERACILLIN AND TAZOBACTAM SCH MLS/HR: 4; .5 INJECTION, POWDER, LYOPHILIZED, FOR SOLUTION INTRAVENOUS; PARENTERAL at 01:36

## 2019-03-15 RX ADMIN — KETOROLAC TROMETHAMINE PRN MG: 10 TABLET, FILM COATED ORAL at 20:33

## 2019-03-15 RX ADMIN — INSULIN LISPRO SCH UNIT: 100 INJECTION, SOLUTION INTRAVENOUS; SUBCUTANEOUS at 17:25

## 2019-03-15 RX ADMIN — DIAZEPAM SCH MG: 5 TABLET ORAL at 22:38

## 2019-03-15 RX ADMIN — VANCOMYCIN HYDROCHLORIDE SCH MLS/HR: 1 INJECTION, POWDER, LYOPHILIZED, FOR SOLUTION INTRAVENOUS at 22:40

## 2019-03-15 RX ADMIN — INSULIN LISPRO SCH UNIT: 100 INJECTION, SOLUTION INTRAVENOUS; SUBCUTANEOUS at 22:39

## 2019-03-15 RX ADMIN — OXYCODONE AND ACETAMINOPHEN PRN TAB: 5; 325 TABLET ORAL at 04:37

## 2019-03-15 RX ADMIN — KETOROLAC TROMETHAMINE PRN MG: 30 INJECTION, SOLUTION INTRAMUSCULAR at 12:11

## 2019-03-15 NOTE — PDOC PROGRESS REPORT
Subjective


Progress Note for:: 03/15/19


Subjective:: 


BRIANDA SNOW is a 48 year old male male patient with past medical history 

of type 2 diabetes mellitus, hypertension, alcoholism presents to ED for left 

foot pain, infection and ulceration.  Wound and blood cultures are pending.  

Patient has been started empirically on vancomycin and Zosyn.  Yesterday Dr. Ojeda performed extensive aggressive debridement of left foot including 

amputation of toes second, third and fourth metatarsal heads.  Morning I seen navdeep valenzuela resting in bed and he reports his pain is relatively better than 

yesterday.  The surgical site is cleanly dressed no soaking or bleeding.





Reason For Visit: 


LEFT INFECTED WITH GANGRENOUS FOOT








Physical Exam


Vital Signs: 


                                        











Temp Pulse Resp BP Pulse Ox


 


 98.8 F   101 H  18   155/73 H  97 


 


 03/15/19 11:52  03/15/19 11:52  03/15/19 11:52  03/15/19 11:52  03/15/19 11:52








                                 Intake & Output











 03/14/19 03/15/19 03/16/19





 06:59 06:59 06:59


 


Intake Total  4925 450


 


Output Total  420 


 


Balance  4505 450


 


Weight  75.2 kg 75.2 kg











General appearance: PRESENT: no acute distress, well-developed, well-nourished


Head exam: PRESENT: atraumatic, normocephalic


Eye exam: PRESENT: conjunctiva pink, EOMI, PERRLA.  ABSENT: scleral icterus


Ear exam: PRESENT: normal external ear exam


Mouth exam: PRESENT: moist, tongue midline


Neck exam: ABSENT: carotid bruit, JVD, lymphadenopathy, thyromegaly


Respiratory exam: PRESENT: clear to auscultation lorenzo.  ABSENT: rales, rhonchi, 

wheezes


Cardiovascular exam: PRESENT: RRR.  ABSENT: diastolic murmur, rubs, systolic 

murmur


Pulses: PRESENT: normal dorsalis pedis pul


Vascular exam: PRESENT: normal capillary refill


GI/Abdominal exam: PRESENT: normal bowel sounds, soft.  ABSENT: distended, 

guarding, mass, organolmegaly, rebound, tenderness


Rectal exam: PRESENT: deferred


Extremities exam: PRESENT: other - Left foot is dressed.


Neurological exam: PRESENT: alert, awake, oriented to person, oriented to place,

oriented to time, oriented to situation, CN II-XII grossly intact.  ABSENT: 

motor sensory deficit


Psychiatric exam: PRESENT: appropriate affect, normal mood.  ABSENT: homicidal 

ideation, suicidal ideation


Skin exam: PRESENT: dry, intact, warm.  ABSENT: cyanosis, rash





Results


Laboratory Results: 


                                        





                                 03/15/19 07:50 





                                 03/15/19 07:50 





                                        











  03/14/19 03/15/19 03/15/19





  13:57 07:50 07:50


 


WBC   16.6 H 


 


RBC   3.44 L 


 


Hgb   12.0 L 


 


Hct   34.8 L 


 


MCV   101 H 


 


MCH   35.0 H 


 


MCHC   34.5 


 


RDW   12.5 


 


Plt Count   372 


 


Seg Neutrophils %   86.3 H 


 


Lymphocytes %   5.9 L 


 


Monocytes %   7.3 


 


Eosinophils %   0.3 


 


Basophils %   0.2 


 


Absolute Neutrophils   14.4 H 


 


Absolute Lymphocytes   1.0 


 


Absolute Monocytes   1.2 


 


Absolute Eosinophils   0.1 


 


Absolute Basophils   0.0 


 


Sodium    133.7 L


 


Potassium    4.0


 


Chloride    93 L


 


Carbon Dioxide    28


 


Anion Gap    13


 


BUN    17


 


Creatinine    0.85


 


Est GFR ( Amer)    > 60


 


Est GFR (Non-Af Amer)    > 60


 


Glucose    248 H


 


Calcium    8.5


 


Ammonia  < 8.7 L  








                                        











  03/14/19 03/14/19





  11:00 11:00


 


Creatine Kinase  28 L 


 


CK-MB (CK-2)   0.34


 


Troponin I   < 0.012











Impressions: 


                                        





Chest X-Ray  03/14/19 11:26


IMPRESSION:  DIFFUSE INTERSTITIAL SCARRING.  NO ACUTE RADIOGRAPHIC FINDING IN 

THE CHEST.


 








Foot X-Ray  03/14/19 11:28


IMPRESSION:  SUPERFICIAL SOFT TISSUE ULCERATION ON THE LATERAL IMAGE.  ON THE AP

AND OBLIQUE IMAGES THERE IS PATCHY OSTEOPENIA AND INDISTINCT APPEARANCE OF THE 

CORTEX AT THE BASE OF THE PROXIMAL PHALANX OF THE 3RD AND 4TH TOE.  SIMILAR 

APPEARANCE INVOLVING THE HEAD OF THE 3RD AND 4TH METATARSALS AND POSSIBLY THE 

2ND.  FINDINGS SUSPICIOUS FOR OSTEOMYELITIS.  FURTHER EVALUATION WITH MRI OF THE

FOOT MAY BE INDICATED.


 














Assessment & Plan





- Diagnosis


(1) Left infected gangrenous foot


Is this a current diagnosis for this admission?: Yes   


Plan: 


Second and third left toe looks it has with gangrene.


Dr. Kendall consulted.


We will keep him on IV vancomycin and Zosyn.








(2) Leukocytosis


Is this a current diagnosis for this admission?: Yes   


Plan: 


His white cell count trended down from 20,000-16,000.








(3) Uncontrolled diabetes mellitus


Qualifiers: 


   Diabetes mellitus type: type 2 


Is this a current diagnosis for this admission?: Yes   


Plan: 


Continue current regimen








(4) Alcoholism /alcohol abuse


Is this a current diagnosis for this admission?: Yes   


Plan: 


I will put him on alcohol withdrawal protocol.








(5) Hypertension


Qualifiers: 


   Hypertension type: essential hypertension   Qualified Code(s): I10 - 

Essential (primary) hypertension   


Is this a current diagnosis for this admission?: Yes   


Plan: 


I will start him on metoprolol

## 2019-03-15 NOTE — PDOC PROGRESS REPORT
Subjective


Progress Note for:: 03/15/19


Subjective:: 





pains operative site left foot


Reason For Visit: 


LEFT INFECTED WITH GANGRENOUS FOOT








Physical Exam


Vital Signs: 


                                        











Temp Pulse Resp BP Pulse Ox


 


 98.7 F   90   18   155/63 H  98 


 


 03/15/19 15:51  03/15/19 15:51  03/15/19 15:51  03/15/19 15:51  03/15/19 15:51








                                 Intake & Output











 03/14/19 03/15/19 03/16/19





 06:59 06:59 06:59


 


Intake Total  4925 1480


 


Output Total  420 


 


Balance  4505 1480


 


Weight  75.2 kg 75.2 kg











Exam: 





Dressings changed and repacked wound with .25 inch iodoform gauze. Left penrose 

drain that is looped to further up dorsum of foot.


Wound looks dry and tender. No abscess collection after removal and changed of 

dressing.





Results


Laboratory Results: 


                                        





                                 03/15/19 07:50 





                                 03/15/19 07:50 





                                        











  03/15/19 03/15/19





  07:50 07:50


 


WBC  16.6 H 


 


RBC  3.44 L 


 


Hgb  12.0 L 


 


Hct  34.8 L 


 


MCV  101 H 


 


MCH  35.0 H 


 


MCHC  34.5 


 


RDW  12.5 


 


Plt Count  372 


 


Seg Neutrophils %  86.3 H 


 


Lymphocytes %  5.9 L 


 


Monocytes %  7.3 


 


Eosinophils %  0.3 


 


Basophils %  0.2 


 


Absolute Neutrophils  14.4 H 


 


Absolute Lymphocytes  1.0 


 


Absolute Monocytes  1.2 


 


Absolute Eosinophils  0.1 


 


Absolute Basophils  0.0 


 


Sodium   133.7 L


 


Potassium   4.0


 


Chloride   93 L


 


Carbon Dioxide   28


 


Anion Gap   13


 


BUN   17


 


Creatinine   0.85


 


Est GFR ( Amer)   > 60


 


Est GFR (Non-Af Amer)   > 60


 


Glucose   248 H


 


Calcium   8.5








                                        











  03/14/19 03/14/19





  11:00 11:00


 


Creatine Kinase  28 L 


 


CK-MB (CK-2)   0.34


 


Troponin I   < 0.012











Impressions: 


                                        





Chest X-Ray  03/14/19 11:26


IMPRESSION:  DIFFUSE INTERSTITIAL SCARRING.  NO ACUTE RADIOGRAPHIC FINDING IN 

THE CHEST.


 








Foot X-Ray  03/14/19 11:28


IMPRESSION:  SUPERFICIAL SOFT TISSUE ULCERATION ON THE LATERAL IMAGE.  ON THE AP

AND OBLIQUE IMAGES THERE IS PATCHY OSTEOPENIA AND INDISTINCT APPEARANCE OF THE 

CORTEX AT THE BASE OF THE PROXIMAL PHALANX OF THE 3RD AND 4TH TOE.  SIMILAR 

APPEARANCE INVOLVING THE HEAD OF THE 3RD AND 4TH METATARSALS AND POSSIBLY THE 

2ND.  FINDINGS SUSPICIOUS FOR OSTEOMYELITIS.  FURTHER EVALUATION WITH MRI OF THE

FOOT MAY BE INDICATED.


 














Assessment & Plan





- Time


Time Spent with patient: 15-24 minutes





- Inpatient Certification


Medical Necessity: Need for IV Antibiotics, Risk of Complication if Not Cared 

For in Hospital





- Plan Summary


Plan Summary: 





Continue IV antibiotics


Re-evaluate in am and see if candidate for a wound vac.

## 2019-03-16 LAB
ADD MANUAL DIFF: NO
ANION GAP SERPL CALC-SCNC: 10 MMOL/L (ref 5–19)
BASOPHILS # BLD AUTO: 0.1 10^3/UL (ref 0–0.2)
BASOPHILS NFR BLD AUTO: 1 % (ref 0–2)
BUN SERPL-MCNC: 14 MG/DL (ref 7–20)
CALCIUM: 8.4 MG/DL (ref 8.4–10.2)
CHLORIDE SERPL-SCNC: 97 MMOL/L (ref 98–107)
CO2 SERPL-SCNC: 26 MMOL/L (ref 22–30)
EOSINOPHIL # BLD AUTO: 0.1 10^3/UL (ref 0–0.6)
EOSINOPHIL NFR BLD AUTO: 0.6 % (ref 0–6)
ERYTHROCYTE [DISTWIDTH] IN BLOOD BY AUTOMATED COUNT: 12.7 % (ref 11.5–14)
GLUCOSE SERPL-MCNC: 190 MG/DL (ref 75–110)
HCT VFR BLD CALC: 31.9 % (ref 37.9–51)
HGB BLD-MCNC: 11.4 G/DL (ref 13.5–17)
LYMPHOCYTES # BLD AUTO: 1.3 10^3/UL (ref 0.5–4.7)
LYMPHOCYTES NFR BLD AUTO: 9.1 % (ref 13–45)
MCH RBC QN AUTO: 35.8 PG (ref 27–33.4)
MCHC RBC AUTO-ENTMCNC: 35.7 G/DL (ref 32–36)
MCV RBC AUTO: 100 FL (ref 80–97)
MONOCYTES # BLD AUTO: 1.3 10^3/UL (ref 0.1–1.4)
MONOCYTES NFR BLD AUTO: 9.2 % (ref 3–13)
NEUTROPHILS # BLD AUTO: 11 10^3/UL (ref 1.7–8.2)
NEUTS SEG NFR BLD AUTO: 80.1 % (ref 42–78)
PLATELET # BLD: 419 10^3/UL (ref 150–450)
POTASSIUM SERPL-SCNC: 4.6 MMOL/L (ref 3.6–5)
RBC # BLD AUTO: 3.19 10^6/UL (ref 4.35–5.55)
SODIUM SERPL-SCNC: 133 MMOL/L (ref 137–145)
TOTAL CELLS COUNTED % (AUTO): 100 %
VANCOMYCIN,TROUGH: 9.8 UG/ML (ref 5–20)
WBC # BLD AUTO: 13.7 10^3/UL (ref 4–10.5)

## 2019-03-16 RX ADMIN — DIAZEPAM SCH MG: 5 TABLET ORAL at 05:05

## 2019-03-16 RX ADMIN — INSULIN HUMAN SCH UNIT: 100 INJECTION, SUSPENSION SUBCUTANEOUS at 09:33

## 2019-03-16 RX ADMIN — PIPERACILLIN AND TAZOBACTAM SCH MLS/HR: 4; .5 INJECTION, POWDER, LYOPHILIZED, FOR SOLUTION INTRAVENOUS; PARENTERAL at 23:39

## 2019-03-16 RX ADMIN — INSULIN LISPRO SCH UNIT: 100 INJECTION, SOLUTION INTRAVENOUS; SUBCUTANEOUS at 17:10

## 2019-03-16 RX ADMIN — INSULIN LISPRO SCH UNIT: 100 INJECTION, SOLUTION INTRAVENOUS; SUBCUTANEOUS at 21:51

## 2019-03-16 RX ADMIN — INSULIN LISPRO SCH UNIT: 100 INJECTION, SOLUTION INTRAVENOUS; SUBCUTANEOUS at 12:45

## 2019-03-16 RX ADMIN — VANCOMYCIN HYDROCHLORIDE SCH MLS/HR: 1 INJECTION, POWDER, LYOPHILIZED, FOR SOLUTION INTRAVENOUS at 21:51

## 2019-03-16 RX ADMIN — PIPERACILLIN AND TAZOBACTAM SCH MLS/HR: 4; .5 INJECTION, POWDER, LYOPHILIZED, FOR SOLUTION INTRAVENOUS; PARENTERAL at 05:06

## 2019-03-16 RX ADMIN — PIPERACILLIN AND TAZOBACTAM SCH MLS/HR: 4; .5 INJECTION, POWDER, LYOPHILIZED, FOR SOLUTION INTRAVENOUS; PARENTERAL at 01:25

## 2019-03-16 RX ADMIN — DIAZEPAM SCH MG: 5 TABLET ORAL at 21:51

## 2019-03-16 RX ADMIN — FAMOTIDINE SCH MG: 20 TABLET, FILM COATED ORAL at 21:50

## 2019-03-16 RX ADMIN — ENOXAPARIN SODIUM SCH MG: 40 INJECTION SUBCUTANEOUS at 09:32

## 2019-03-16 RX ADMIN — INSULIN LISPRO SCH UNIT: 100 INJECTION, SOLUTION INTRAVENOUS; SUBCUTANEOUS at 09:33

## 2019-03-16 RX ADMIN — KETOROLAC TROMETHAMINE PRN MG: 10 TABLET, FILM COATED ORAL at 15:32

## 2019-03-16 RX ADMIN — KETOROLAC TROMETHAMINE PRN MG: 10 TABLET, FILM COATED ORAL at 04:18

## 2019-03-16 RX ADMIN — KETOROLAC TROMETHAMINE PRN MG: 10 TABLET, FILM COATED ORAL at 21:50

## 2019-03-16 RX ADMIN — OXYCODONE AND ACETAMINOPHEN PRN TAB: 5; 325 TABLET ORAL at 09:42

## 2019-03-16 RX ADMIN — DIAZEPAM SCH MG: 5 TABLET ORAL at 13:00

## 2019-03-16 RX ADMIN — VANCOMYCIN HYDROCHLORIDE SCH MLS/HR: 1 INJECTION, POWDER, LYOPHILIZED, FOR SOLUTION INTRAVENOUS at 09:33

## 2019-03-16 RX ADMIN — INSULIN HUMAN SCH UNIT: 100 INJECTION, SUSPENSION SUBCUTANEOUS at 17:10

## 2019-03-16 RX ADMIN — PIPERACILLIN AND TAZOBACTAM SCH MLS/HR: 4; .5 INJECTION, POWDER, LYOPHILIZED, FOR SOLUTION INTRAVENOUS; PARENTERAL at 17:10

## 2019-03-16 RX ADMIN — FAMOTIDINE SCH MG: 20 TABLET, FILM COATED ORAL at 09:32

## 2019-03-16 RX ADMIN — OXYCODONE AND ACETAMINOPHEN PRN TAB: 5; 325 TABLET ORAL at 23:54

## 2019-03-16 RX ADMIN — PIPERACILLIN AND TAZOBACTAM SCH MLS/HR: 4; .5 INJECTION, POWDER, LYOPHILIZED, FOR SOLUTION INTRAVENOUS; PARENTERAL at 12:45

## 2019-03-16 NOTE — PDOC PROGRESS REPORT
Subjective


Progress Note for:: 03/16/19


Subjective:: 


No new complaint no significant change overnight.





Reason For Visit: 


LEFT INFECTED WITH GANGRENOUS FOOT








Physical Exam


Vital Signs: 


                                        











Temp Pulse Resp BP Pulse Ox


 


 98.2 F   92   19   158/83 H  97 


 


 03/16/19 08:00  03/16/19 08:00  03/16/19 08:00  03/16/19 08:00  03/16/19 08:00








                                 Intake & Output











 03/15/19 03/16/19 03/17/19





 06:59 06:59 06:59


 


Intake Total 4925 1930 


 


Output Total 420  


 


Balance 4505 1930 


 


Weight 75.2 kg 75.6 kg 











General appearance: PRESENT: no acute distress


Head exam: PRESENT: atraumatic, normocephalic


Eye exam: PRESENT: conjunctiva pink


Mouth exam: PRESENT: moist


Neck exam: ABSENT: carotid bruit, JVD, lymphadenopathy, thyromegaly


Respiratory exam: PRESENT: clear to auscultation lorenzo.  ABSENT: rales, rhonchi, 

wheezes


Cardiovascular exam: PRESENT: RRR.  ABSENT: diastolic murmur, rubs, systolic 

murmur


GI/Abdominal exam: PRESENT: normal bowel sounds, soft.  ABSENT: distended, 

guarding, mass, organolmegaly, rebound, tenderness


Neurological exam: PRESENT: alert, awake, oriented to time, oriented to situatio

n





Results


Laboratory Results: 


                                        





                                 03/16/19 07:20 





                                 03/16/19 07:20 





                                        











  03/16/19 03/16/19





  07:20 07:20


 


WBC  13.7 H 


 


RBC  3.19 L 


 


Hgb  11.4 L 


 


Hct  31.9 L 


 


MCV  100 H 


 


MCH  35.8 H 


 


MCHC  35.7 


 


RDW  12.7 


 


Plt Count  419 


 


Seg Neutrophils %  80.1 H 


 


Lymphocytes %  9.1 L 


 


Monocytes %  9.2 


 


Eosinophils %  0.6 


 


Basophils %  1.0 


 


Absolute Neutrophils  11.0 H 


 


Absolute Lymphocytes  1.3 


 


Absolute Monocytes  1.3 


 


Absolute Eosinophils  0.1 


 


Absolute Basophils  0.1 


 


Sodium   133.0 L


 


Potassium   4.6


 


Chloride   97 L


 


Carbon Dioxide   26


 


Anion Gap   10


 


BUN   14


 


Creatinine   0.79


 


Est GFR ( Amer)   > 60


 


Est GFR (Non-Af Amer)   > 60


 


Glucose   190 H


 


Calcium   8.4








                                        





03/14/19 16:27   Foot - Left   Gram Stain - Final





                                        











  03/14/19 03/14/19





  11:00 11:00


 


Creatine Kinase  28 L 


 


CK-MB (CK-2)   0.34


 


Troponin I   < 0.012











Impressions: 


                                        





Chest X-Ray  03/14/19 11:26


IMPRESSION:  DIFFUSE INTERSTITIAL SCARRING.  NO ACUTE RADIOGRAPHIC FINDING IN 

THE CHEST.


 








Foot X-Ray  03/14/19 11:28


IMPRESSION:  SUPERFICIAL SOFT TISSUE ULCERATION ON THE LATERAL IMAGE.  ON THE AP

AND OBLIQUE IMAGES THERE IS PATCHY OSTEOPENIA AND INDISTINCT APPEARANCE OF THE 

CORTEX AT THE BASE OF THE PROXIMAL PHALANX OF THE 3RD AND 4TH TOE.  SIMILAR 

APPEARANCE INVOLVING THE HEAD OF THE 3RD AND 4TH METATARSALS AND POSSIBLY THE 

2ND.  FINDINGS SUSPICIOUS FOR OSTEOMYELITIS.  FURTHER EVALUATION WITH MRI OF THE

FOOT MAY BE INDICATED.


 














Assessment & Plan





- Diagnosis


(1) Left infected gangrenous foot


Is this a current diagnosis for this admission?: Yes   


Plan: 


Second and third left toe looks it has with gangrene.


Dr. Kendall consulted.


We will keep him on IV vancomycin and Zosyn.








(2) Leukocytosis


Is this a current diagnosis for this admission?: Yes   


Plan: 


Markedly trending down.








(3) Uncontrolled diabetes mellitus


Qualifiers: 


   Diabetes mellitus type: type 2 


Is this a current diagnosis for this admission?: Yes   


Plan: 


Continue current regimen








(4) Alcoholism /alcohol abuse


Is this a current diagnosis for this admission?: Yes   


Plan: 


I will put him on alcohol withdrawal protocol.








(5) Hypertension


Qualifiers: 


   Hypertension type: essential hypertension   Qualified Code(s): I10 - 

Essential (primary) hypertension   


Is this a current diagnosis for this admission?: Yes   


Plan: 


I will start him on metoprolol

## 2019-03-17 LAB
ADD MANUAL DIFF: NO
ANION GAP SERPL CALC-SCNC: 10 MMOL/L (ref 5–19)
BASOPHILS # BLD AUTO: 0 10^3/UL (ref 0–0.2)
BASOPHILS NFR BLD AUTO: 0.3 % (ref 0–2)
BUN SERPL-MCNC: 12 MG/DL (ref 7–20)
CALCIUM: 8.6 MG/DL (ref 8.4–10.2)
CHLORIDE SERPL-SCNC: 97 MMOL/L (ref 98–107)
CO2 SERPL-SCNC: 28 MMOL/L (ref 22–30)
EOSINOPHIL # BLD AUTO: 0.2 10^3/UL (ref 0–0.6)
EOSINOPHIL NFR BLD AUTO: 1 % (ref 0–6)
ERYTHROCYTE [DISTWIDTH] IN BLOOD BY AUTOMATED COUNT: 12.5 % (ref 11.5–14)
GLUCOSE SERPL-MCNC: 217 MG/DL (ref 75–110)
HCT VFR BLD CALC: 34.8 % (ref 37.9–51)
HGB BLD-MCNC: 11.8 G/DL (ref 13.5–17)
LYMPHOCYTES # BLD AUTO: 1.5 10^3/UL (ref 0.5–4.7)
LYMPHOCYTES NFR BLD AUTO: 9.7 % (ref 13–45)
MCH RBC QN AUTO: 34.5 PG (ref 27–33.4)
MCHC RBC AUTO-ENTMCNC: 33.8 G/DL (ref 32–36)
MCV RBC AUTO: 102 FL (ref 80–97)
MONOCYTES # BLD AUTO: 1.5 10^3/UL (ref 0.1–1.4)
MONOCYTES NFR BLD AUTO: 9.7 % (ref 3–13)
NEUTROPHILS # BLD AUTO: 11.8 10^3/UL (ref 1.7–8.2)
NEUTS SEG NFR BLD AUTO: 79.3 % (ref 42–78)
PLATELET # BLD: 473 10^3/UL (ref 150–450)
POTASSIUM SERPL-SCNC: 4.2 MMOL/L (ref 3.6–5)
RBC # BLD AUTO: 3.4 10^6/UL (ref 4.35–5.55)
SODIUM SERPL-SCNC: 134.8 MMOL/L (ref 137–145)
TOTAL CELLS COUNTED % (AUTO): 100 %
WBC # BLD AUTO: 15 10^3/UL (ref 4–10.5)

## 2019-03-17 RX ADMIN — INSULIN LISPRO SCH UNIT: 100 INJECTION, SOLUTION INTRAVENOUS; SUBCUTANEOUS at 21:23

## 2019-03-17 RX ADMIN — DOCUSATE SODIUM SCH MG: 100 CAPSULE, LIQUID FILLED ORAL at 17:46

## 2019-03-17 RX ADMIN — FAMOTIDINE SCH MG: 20 TABLET, FILM COATED ORAL at 11:51

## 2019-03-17 RX ADMIN — DIAZEPAM SCH MG: 5 TABLET ORAL at 21:24

## 2019-03-17 RX ADMIN — PIPERACILLIN AND TAZOBACTAM SCH MLS/HR: 4; .5 INJECTION, POWDER, LYOPHILIZED, FOR SOLUTION INTRAVENOUS; PARENTERAL at 05:08

## 2019-03-17 RX ADMIN — DIAZEPAM SCH MG: 5 TABLET ORAL at 05:08

## 2019-03-17 RX ADMIN — KETOROLAC TROMETHAMINE PRN MG: 10 TABLET, FILM COATED ORAL at 08:56

## 2019-03-17 RX ADMIN — OXYCODONE AND ACETAMINOPHEN PRN TAB: 5; 325 TABLET ORAL at 21:24

## 2019-03-17 RX ADMIN — SODIUM CHLORIDE, SODIUM LACTATE, POTASSIUM CHLORIDE, AND CALCIUM CHLORIDE PRN MLS/HR: .6; .31; .03; .02 INJECTION, SOLUTION INTRAVENOUS at 15:14

## 2019-03-17 RX ADMIN — FAMOTIDINE SCH MG: 20 TABLET, FILM COATED ORAL at 21:25

## 2019-03-17 RX ADMIN — DIAZEPAM SCH MG: 5 TABLET ORAL at 15:14

## 2019-03-17 RX ADMIN — INSULIN LISPRO SCH UNIT: 100 INJECTION, SOLUTION INTRAVENOUS; SUBCUTANEOUS at 11:57

## 2019-03-17 RX ADMIN — OXYCODONE AND ACETAMINOPHEN PRN TAB: 5; 325 TABLET ORAL at 11:57

## 2019-03-17 RX ADMIN — INSULIN LISPRO SCH UNIT: 100 INJECTION, SOLUTION INTRAVENOUS; SUBCUTANEOUS at 17:45

## 2019-03-17 RX ADMIN — INSULIN HUMAN SCH UNIT: 100 INJECTION, SUSPENSION SUBCUTANEOUS at 08:47

## 2019-03-17 RX ADMIN — ENOXAPARIN SODIUM SCH MG: 40 INJECTION SUBCUTANEOUS at 11:52

## 2019-03-17 RX ADMIN — CLINDAMYCIN PHOSPHATE SCH MLS/HR: 12 INJECTION, SOLUTION INTRAVENOUS at 15:13

## 2019-03-17 RX ADMIN — INSULIN HUMAN SCH UNIT: 100 INJECTION, SUSPENSION SUBCUTANEOUS at 17:44

## 2019-03-17 RX ADMIN — INSULIN LISPRO SCH UNIT: 100 INJECTION, SOLUTION INTRAVENOUS; SUBCUTANEOUS at 08:49

## 2019-03-17 RX ADMIN — CLINDAMYCIN PHOSPHATE SCH MLS/HR: 12 INJECTION, SOLUTION INTRAVENOUS at 21:25

## 2019-03-17 NOTE — PDOC PROGRESS REPORT
Subjective


Progress Note for:: 03/17/19


Subjective:: 


No new complaint.  His wound culture grew gram-positive beta-hemolytic 

streptococci and Staphylococcus xylosus which is resistant to penicillins 

erythromycin and carboplatin times is only sensitive to vancomycin and 

clindamycin.  Staphylococcus xylose this is a is a human and animal skin jayson. 

It can cause human infections like pyelonephritis.


Reason For Visit: 


LEFT INFECTED WITH GANGRENOUS FOOT








Physical Exam


Vital Signs: 


                                        











Temp Pulse Resp BP Pulse Ox


 


 98.5 F   94   18   130/75 H  95 


 


 03/17/19 08:00  03/17/19 08:00  03/17/19 08:00  03/17/19 08:00  03/17/19 08:00








                                 Intake & Output











 03/16/19 03/17/19 03/18/19





 06:59 06:59 06:59


 


Intake Total 1930 2310 


 


Output Total  1100 


 


Balance 1930 1210 


 


Weight 75.6 kg 76.8 kg 














Results


Laboratory Results: 


                                        





                                 03/17/19 07:00 





                                 03/17/19 07:00 





                                        











  03/17/19 03/17/19





  07:00 07:00


 


WBC  15.0 H 


 


RBC  3.40 L 


 


Hgb  11.8 L 


 


Hct  34.8 L 


 


MCV  102 H 


 


MCH  34.5 H 


 


MCHC  33.8 


 


RDW  12.5 


 


Plt Count  473 H 


 


Seg Neutrophils %  79.3 H 


 


Lymphocytes %  9.7 L 


 


Monocytes %  9.7 


 


Eosinophils %  1.0 


 


Basophils %  0.3 


 


Absolute Neutrophils  11.8 H 


 


Absolute Lymphocytes  1.5 


 


Absolute Monocytes  1.5 H 


 


Absolute Eosinophils  0.2 


 


Absolute Basophils  0.0 


 


Sodium   134.8 L


 


Potassium   4.2


 


Chloride   97 L


 


Carbon Dioxide   28


 


Anion Gap   10


 


BUN   12


 


Creatinine   1.13


 


Est GFR ( Amer)   > 60


 


Est GFR (Non-Af Amer)   > 60


 


Glucose   217 H


 


Calcium   8.6








                                        





03/14/19 16:27   Foot - Left   Gram Stain - Final


03/14/19 16:27   Foot - Left   Wound Culture - Final


                            Staphylococcus Xylosus


                            Group B Beta Streptococcus


                            No Anaerobic Organisms





                                        











  03/14/19 03/14/19





  11:00 11:00


 


Creatine Kinase  28 L 


 


CK-MB (CK-2)   0.34


 


Troponin I   < 0.012











Impressions: 


                                        





Chest X-Ray  03/14/19 11:26


IMPRESSION:  DIFFUSE INTERSTITIAL SCARRING.  NO ACUTE RADIOGRAPHIC FINDING IN 

THE CHEST.


 








Foot X-Ray  03/14/19 11:28


IMPRESSION:  SUPERFICIAL SOFT TISSUE ULCERATION ON THE LATERAL IMAGE.  ON THE AP

AND OBLIQUE IMAGES THERE IS PATCHY OSTEOPENIA AND INDISTINCT APPEARANCE OF THE C

ORTEX AT THE BASE OF THE PROXIMAL PHALANX OF THE 3RD AND 4TH TOE.  SIMILAR 

APPEARANCE INVOLVING THE HEAD OF THE 3RD AND 4TH METATARSALS AND POSSIBLY THE 

2ND.  FINDINGS SUSPICIOUS FOR OSTEOMYELITIS.  FURTHER EVALUATION WITH MRI OF THE

FOOT MAY BE INDICATED.


 














Assessment & Plan





- Diagnosis


(1) Left infected gangrenous foot


Is this a current diagnosis for this admission?: Yes   


Plan: 


I will switch him to clindamycin only








(2) Leukocytosis


Is this a current diagnosis for this admission?: Yes   


Plan: 


Markedly trending down.








(3) Uncontrolled diabetes mellitus


Qualifiers: 


   Diabetes mellitus type: type 2 


Is this a current diagnosis for this admission?: Yes   


Plan: 


Continue current regimen








(4) Alcoholism /alcohol abuse


Is this a current diagnosis for this admission?: Yes   


Plan: 


I will put him on alcohol withdrawal protocol.








(5) Hypertension


Qualifiers: 


   Hypertension type: essential hypertension   Qualified Code(s): I10 - 

Essential (primary) hypertension   


Is this a current diagnosis for this admission?: Yes   


Plan: 


I will start him on metoprolol

## 2019-03-17 NOTE — PDOC PROGRESS REPORT
Subjective


Progress Note for:: 03/17/19


Subjective:: 





No new complaints


Reason For Visit: 


LEFT INFECTED WITH GANGRENOUS FOOT








Physical Exam


Vital Signs: 


                                        











Temp Pulse Resp BP Pulse Ox


 


 98.5 F   94   18   130/75 H  95 


 


 03/17/19 08:00  03/17/19 08:00  03/17/19 08:00  03/17/19 08:00  03/17/19 08:00








                                 Intake & Output











 03/16/19 03/17/19 03/18/19





 06:59 06:59 06:59


 


Intake Total 1930 2310 


 


Output Total  1100 


 


Balance 1930 1210 


 


Weight 75.6 kg 76.8 kg 











General appearance: PRESENT: no acute distress


Musculoskeletal exam: PRESENT: other - Dressing removed; loop drain removed; 

still some pus coming out from the dorsal skin pocket.  There is some 

granulation tissue.  There is no active foul smell.  The mid forefoot is mildly 

tender





Results


Laboratory Results: 


                                        





                                 03/17/19 07:00 





                                 03/17/19 07:00 





                                        











  03/17/19 03/17/19





  07:00 07:00


 


WBC  15.0 H 


 


RBC  3.40 L 


 


Hgb  11.8 L 


 


Hct  34.8 L 


 


MCV  102 H 


 


MCH  34.5 H 


 


MCHC  33.8 


 


RDW  12.5 


 


Plt Count  473 H 


 


Seg Neutrophils %  79.3 H 


 


Lymphocytes %  9.7 L 


 


Monocytes %  9.7 


 


Eosinophils %  1.0 


 


Basophils %  0.3 


 


Absolute Neutrophils  11.8 H 


 


Absolute Lymphocytes  1.5 


 


Absolute Monocytes  1.5 H 


 


Absolute Eosinophils  0.2 


 


Absolute Basophils  0.0 


 


Sodium   134.8 L


 


Potassium   4.2


 


Chloride   97 L


 


Carbon Dioxide   28


 


Anion Gap   10


 


BUN   12


 


Creatinine   1.13


 


Est GFR ( Amer)   > 60


 


Est GFR (Non-Af Amer)   > 60


 


Glucose   217 H


 


Calcium   8.6








                                        





03/14/19 16:27   Foot - Left   Gram Stain - Final


03/14/19 16:27   Foot - Left   Wound Culture - Final


                            Staphylococcus Xylosus


                            Group B Beta Streptococcus


                            No Anaerobic Organisms





                                        











  03/14/19 03/14/19





  11:00 11:00


 


Creatine Kinase  28 L 


 


CK-MB (CK-2)   0.34


 


Troponin I   < 0.012











Impressions: 


                                        





Chest X-Ray  03/14/19 11:26


IMPRESSION:  DIFFUSE INTERSTITIAL SCARRING.  NO ACUTE RADIOGRAPHIC FINDING IN 

THE CHEST.


 








Foot X-Ray  03/14/19 11:28


IMPRESSION:  SUPERFICIAL SOFT TISSUE ULCERATION ON THE LATERAL IMAGE.  ON THE AP

AND OBLIQUE IMAGES THERE IS PATCHY OSTEOPENIA AND INDISTINCT APPEARANCE OF THE 

CORTEX AT THE BASE OF THE PROXIMAL PHALANX OF THE 3RD AND 4TH TOE.  SIMILAR 

APPEARANCE INVOLVING THE HEAD OF THE 3RD AND 4TH METATARSALS AND POSSIBLY THE 

2ND.  FINDINGS SUSPICIOUS FOR OSTEOMYELITIS.  FURTHER EVALUATION WITH MRI OF THE

FOOT MAY BE INDICATED.


 














Assessment & Plan





- Diagnosis


(1) Left infected gangrenous foot


Is this a current diagnosis for this admission?: Yes   


Plan: 


Impression: Patient is postoperative day 3 status post debridement left foot 

with residual infection; may be amenable to aggressive local wound care, 

dressing changes.  Blood sugars still elevated; white blood cell count still 

elevated I: Cultures are  growing staph and strep; appropriate antibiotics being

given








Recommendations:





1.  We will get patient in shower; instructed nurses on wound dressing with 

packing of distal skin flap with gauze





2.  I discussed the care plan with Dr. Saleh; we agreed that the patient 

will need hospitalization for several more days until the foot is lysed; patient

may require additional debridement








(2) Alcoholism /alcohol abuse


Is this a current diagnosis for this admission?: Yes   





(3) Hypertension


Qualifiers: 


   Hypertension type: essential hypertension   Qualified Code(s): I10 - 

Essential (primary) hypertension   


Is this a current diagnosis for this admission?: Yes   





(4) Uncontrolled diabetes mellitus


Qualifiers: 


   Diabetes mellitus type: type 2 


Is this a current diagnosis for this admission?: Yes

## 2019-03-18 LAB — VANCOMYCIN,TROUGH: < 5 UG/ML (ref 5–20)

## 2019-03-18 PROCEDURE — 0HBNXZZ EXCISION OF LEFT FOOT SKIN, EXTERNAL APPROACH: ICD-10-PCS | Performed by: SURGERY

## 2019-03-18 RX ADMIN — CLINDAMYCIN PHOSPHATE SCH MLS/HR: 12 INJECTION, SOLUTION INTRAVENOUS at 06:30

## 2019-03-18 RX ADMIN — INSULIN LISPRO SCH UNIT: 100 INJECTION, SOLUTION INTRAVENOUS; SUBCUTANEOUS at 22:53

## 2019-03-18 RX ADMIN — INSULIN HUMAN SCH UNIT: 100 INJECTION, SUSPENSION SUBCUTANEOUS at 08:33

## 2019-03-18 RX ADMIN — DOCUSATE SODIUM SCH MG: 100 CAPSULE, LIQUID FILLED ORAL at 17:03

## 2019-03-18 RX ADMIN — DOCUSATE SODIUM SCH MG: 100 CAPSULE, LIQUID FILLED ORAL at 09:36

## 2019-03-18 RX ADMIN — KETOROLAC TROMETHAMINE PRN MG: 10 TABLET, FILM COATED ORAL at 22:54

## 2019-03-18 RX ADMIN — FAMOTIDINE SCH MG: 20 TABLET, FILM COATED ORAL at 22:53

## 2019-03-18 RX ADMIN — OXYCODONE AND ACETAMINOPHEN PRN TAB: 5; 325 TABLET ORAL at 12:13

## 2019-03-18 RX ADMIN — OXYCODONE AND ACETAMINOPHEN PRN TAB: 5; 325 TABLET ORAL at 06:29

## 2019-03-18 RX ADMIN — ENOXAPARIN SODIUM SCH MG: 40 INJECTION SUBCUTANEOUS at 09:37

## 2019-03-18 RX ADMIN — FAMOTIDINE SCH MG: 20 TABLET, FILM COATED ORAL at 09:36

## 2019-03-18 RX ADMIN — INSULIN HUMAN SCH UNIT: 100 INJECTION, SUSPENSION SUBCUTANEOUS at 16:35

## 2019-03-18 RX ADMIN — INSULIN LISPRO SCH UNIT: 100 INJECTION, SOLUTION INTRAVENOUS; SUBCUTANEOUS at 16:36

## 2019-03-18 RX ADMIN — CLINDAMYCIN PHOSPHATE SCH MLS/HR: 12 INJECTION, SOLUTION INTRAVENOUS at 13:38

## 2019-03-18 RX ADMIN — DIAZEPAM SCH MG: 5 TABLET ORAL at 22:53

## 2019-03-18 RX ADMIN — CLINDAMYCIN PHOSPHATE SCH MLS/HR: 12 INJECTION, SOLUTION INTRAVENOUS at 22:52

## 2019-03-18 RX ADMIN — SODIUM CHLORIDE, SODIUM LACTATE, POTASSIUM CHLORIDE, AND CALCIUM CHLORIDE PRN MLS/HR: .6; .31; .03; .02 INJECTION, SOLUTION INTRAVENOUS at 16:35

## 2019-03-18 RX ADMIN — INSULIN LISPRO SCH UNIT: 100 INJECTION, SOLUTION INTRAVENOUS; SUBCUTANEOUS at 12:11

## 2019-03-18 RX ADMIN — DIAZEPAM SCH MG: 5 TABLET ORAL at 06:30

## 2019-03-18 RX ADMIN — DIAZEPAM SCH MG: 5 TABLET ORAL at 13:38

## 2019-03-18 RX ADMIN — INSULIN LISPRO SCH UNIT: 100 INJECTION, SOLUTION INTRAVENOUS; SUBCUTANEOUS at 08:32

## 2019-03-18 RX ADMIN — OXYCODONE AND ACETAMINOPHEN PRN TAB: 5; 325 TABLET ORAL at 16:34

## 2019-03-18 NOTE — PDOC PROGRESS REPORT
Subjective


Progress Note for:: 03/18/19


Subjective:: 





Less pains left foot amputation site


Reason For Visit: 


LEFT INFECTED WITH GANGRENOUS FOOT








Physical Exam


Vital Signs: 


                                        











Temp Pulse Resp BP Pulse Ox


 


 99.0 F   92   20   124/70   94 


 


 03/18/19 12:00  03/18/19 12:00  03/18/19 12:00  03/18/19 12:00  03/18/19 12:00








                                 Intake & Output











 03/17/19 03/18/19 03/19/19





 06:59 06:59 06:59


 


Intake Total 2310 2990 100


 


Output Total 1100 2820 


 


Balance 1210 170 100


 


Weight 76.8 kg 76.6 kg 76.6 kg











Exam: 





Dressings changed. Has a small area of necrosis and debrided at bedside. Rest of

wound with starting granulation tissue


 





Results


Laboratory Results: 


                                        





                                 03/17/19 07:00 





                                 03/17/19 07:00 





                                        











  03/14/19 03/14/19





  11:00 11:00


 


Creatine Kinase  28 L 


 


CK-MB (CK-2)   0.34


 


Troponin I   < 0.012











Impressions: 


                                        





Chest X-Ray  03/14/19 11:26


IMPRESSION:  DIFFUSE INTERSTITIAL SCARRING.  NO ACUTE RADIOGRAPHIC FINDING IN 

THE CHEST.


 








Foot X-Ray  03/14/19 11:28


IMPRESSION:  SUPERFICIAL SOFT TISSUE ULCERATION ON THE LATERAL IMAGE.  ON THE AP

AND OBLIQUE IMAGES THERE IS PATCHY OSTEOPENIA AND INDISTINCT APPEARANCE OF THE 

CORTEX AT THE BASE OF THE PROXIMAL PHALANX OF THE 3RD AND 4TH TOE.  SIMILAR 

APPEARANCE INVOLVING THE HEAD OF THE 3RD AND 4TH METATARSALS AND POSSIBLY THE 

2ND.  FINDINGS SUSPICIOUS FOR OSTEOMYELITIS.  FURTHER EVALUATION WITH MRI OF THE

FOOT MAY BE INDICATED.


 














Assessment & Plan





- Time


Time Spent with patient: 15-24 minutes





- Inpatient Certification


Medical Necessity: Need for IV Antibiotics, Risk of Complication if Not Cared 

For in Hospital





- Plan Summary


Plan Summary: 





Will place wound vac tomorrow


Continue IV antibiotics

## 2019-03-18 NOTE — OPERATIVE REPORT E
Operative Report



NAME: BRIANDA SNOW

MRN:  [id number]          : 1970 AGE:  48Y

DATE OF SURGERY: 2019              ROOM: [id number]



PREOPERATIVE DIAGNOSIS:

NECROTIC TISSUE POST LEFT SECOND TO FOURTH TOE AMPUTATION SITE.



POSTOPERATIVE DIAGNOSIS:

NECROTIC TISSUE POST LEFT SECOND TO FOURTH TOE AMPUTATION SITE.



PROCEDURE:

Sharp debridement of necrotic tissue on the bottom part of the wound on

the left foot.



SURGEON:

RAMON COSTELLO M.D.



DESCRIPTION OF PROCEDURE:

Patient was placed in the supine position and the left foot elevated on a

pillow.  There appears to be some necrotic tissue in the bottom part of

the incision.  This was then sharply debrided using scissors and a

scalpel.  The rest of the wound appears to be starting good granulation

tissue.  Most of the necrotic tissue was then excised.  The patient to

continue with wet-to-dry dressings with saline today and possibly require

wound-VAC tomorrow.





DICTATING PHYSICIAN:  RAMON COSTELLO M.D.





[id number]                  DT: [id number]    [id number]

PHY#: [id number]            DD: 2019    [id number]

ID:   [id number]           JOB#: [id number]       ACCT: [id number]



cc:RAMON COSTELLO M.D.

>







MTDD

## 2019-03-18 NOTE — PDOC PROGRESS REPORT
Subjective


Subjective:: 


I seen patient resting in bed comfortably.  He is awake alert oriented is not in

pain or distress.  I discussed the case with Dr. Ojeda who recommended 

several days more of IV antibiotics since patient may need to additional 

debridement.  When patient is ready for discharge he can be sent with oral 

vancomycin.





Reason For Visit: 


LEFT INFECTED WITH GANGRENOUS FOOT








Physical Exam


Vital Signs: 


                                        











Temp Pulse Resp BP Pulse Ox


 


 97.5 F   96   22 H  149/83 H  93 


 


 03/18/19 07:37  03/18/19 07:37  03/18/19 07:37  03/18/19 07:37  03/18/19 07:37








                                 Intake & Output











 03/17/19 03/18/19 03/19/19





 06:59 06:59 06:59


 


Intake Total 2310 2990 50


 


Output Total 1100 2820 


 


Balance 1210 170 50


 


Weight 76.8 kg 76.6 kg 











General appearance: PRESENT: no acute distress


Head exam: PRESENT: atraumatic


Eye exam: PRESENT: conjunctiva pink


Mouth exam: PRESENT: moist


Neck exam: ABSENT: carotid bruit, JVD, lymphadenopathy, thyromegaly


Respiratory exam: PRESENT: clear to auscultation lorenzo.  ABSENT: rales, rhonchi, 

wheezes


GI/Abdominal exam: PRESENT: normal bowel sounds, soft.  ABSENT: distended, 

guarding, mass, organolmegaly, rebound, tenderness


Neurological exam: PRESENT: alert, awake, oriented to time, oriented to 

situation


Psychiatric exam: PRESENT: normal mood





Results


Laboratory Results: 


                                        





                                 03/17/19 07:00 





                                 03/17/19 07:00 





                                        





03/14/19 16:27   Foot - Left   Gram Stain - Final


03/14/19 16:27   Foot - Left   Wound Culture - Final


                            Staphylococcus Xylosus


                            Group B Beta Streptococcus


                            No Anaerobic Organisms





                                        











  03/14/19 03/14/19





  11:00 11:00


 


Creatine Kinase  28 L 


 


CK-MB (CK-2)   0.34


 


Troponin I   < 0.012











Impressions: 


                                        





Chest X-Ray  03/14/19 11:26


IMPRESSION:  DIFFUSE INTERSTITIAL SCARRING.  NO ACUTE RADIOGRAPHIC FINDING IN 

THE CHEST.


 








Foot X-Ray  03/14/19 11:28


IMPRESSION:  SUPERFICIAL SOFT TISSUE ULCERATION ON THE LATERAL IMAGE.  ON THE AP

AND OBLIQUE IMAGES THERE IS PATCHY OSTEOPENIA AND INDISTINCT APPEARANCE OF THE 

CORTEX AT THE BASE OF THE PROXIMAL PHALANX OF THE 3RD AND 4TH TOE.  SIMILAR 

APPEARANCE INVOLVING THE HEAD OF THE 3RD AND 4TH METATARSALS AND POSSIBLY THE 

2ND.  FINDINGS SUSPICIOUS FOR OSTEOMYELITIS.  FURTHER EVALUATION WITH MRI OF THE

FOOT MAY BE INDICATED.


 














Assessment & Plan





- Diagnosis


(1) Left infected gangrenous foot


Is this a current diagnosis for this admission?: Yes   


Plan: 


I will switch him to clindamycin only








(2) Leukocytosis


Is this a current diagnosis for this admission?: Yes   


Plan: 


Markedly trending down.








(3) Uncontrolled diabetes mellitus


Qualifiers: 


   Diabetes mellitus type: type 2 


Is this a current diagnosis for this admission?: Yes   


Plan: 


Continue current regimen








(4) Alcoholism /alcohol abuse


Is this a current diagnosis for this admission?: Yes   


Plan: 


I will put him on alcohol withdrawal protocol.








(5) Hypertension


Qualifiers: 


   Hypertension type: essential hypertension   Qualified Code(s): I10 - 

Essential (primary) hypertension   


Is this a current diagnosis for this admission?: Yes

## 2019-03-19 RX ADMIN — OXYCODONE AND ACETAMINOPHEN PRN TAB: 5; 325 TABLET ORAL at 02:01

## 2019-03-19 RX ADMIN — INSULIN LISPRO SCH UNIT: 100 INJECTION, SOLUTION INTRAVENOUS; SUBCUTANEOUS at 12:17

## 2019-03-19 RX ADMIN — DOCUSATE SODIUM SCH MG: 100 CAPSULE, LIQUID FILLED ORAL at 10:25

## 2019-03-19 RX ADMIN — INSULIN LISPRO SCH: 100 INJECTION, SOLUTION INTRAVENOUS; SUBCUTANEOUS at 17:41

## 2019-03-19 RX ADMIN — FAMOTIDINE SCH MG: 20 TABLET, FILM COATED ORAL at 10:25

## 2019-03-19 RX ADMIN — CLINDAMYCIN PHOSPHATE SCH MLS/HR: 12 INJECTION, SOLUTION INTRAVENOUS at 06:29

## 2019-03-19 RX ADMIN — INSULIN HUMAN SCH UNIT: 100 INJECTION, SUSPENSION SUBCUTANEOUS at 17:46

## 2019-03-19 RX ADMIN — DIAZEPAM SCH MG: 5 TABLET ORAL at 21:57

## 2019-03-19 RX ADMIN — INSULIN LISPRO SCH: 100 INJECTION, SOLUTION INTRAVENOUS; SUBCUTANEOUS at 08:06

## 2019-03-19 RX ADMIN — DIAZEPAM SCH MG: 5 TABLET ORAL at 06:29

## 2019-03-19 RX ADMIN — KETOROLAC TROMETHAMINE PRN MG: 10 TABLET, FILM COATED ORAL at 06:28

## 2019-03-19 RX ADMIN — FAMOTIDINE SCH MG: 20 TABLET, FILM COATED ORAL at 21:57

## 2019-03-19 RX ADMIN — CLINDAMYCIN PHOSPHATE SCH MLS/HR: 12 INJECTION, SOLUTION INTRAVENOUS at 21:56

## 2019-03-19 RX ADMIN — SODIUM CHLORIDE, SODIUM LACTATE, POTASSIUM CHLORIDE, AND CALCIUM CHLORIDE PRN MLS/HR: .6; .31; .03; .02 INJECTION, SOLUTION INTRAVENOUS at 01:59

## 2019-03-19 RX ADMIN — DIAZEPAM SCH MG: 5 TABLET ORAL at 13:13

## 2019-03-19 RX ADMIN — INSULIN HUMAN SCH UNIT: 100 INJECTION, SUSPENSION SUBCUTANEOUS at 10:25

## 2019-03-19 RX ADMIN — ENOXAPARIN SODIUM SCH MG: 40 INJECTION SUBCUTANEOUS at 10:25

## 2019-03-19 RX ADMIN — DOCUSATE SODIUM SCH MG: 100 CAPSULE, LIQUID FILLED ORAL at 17:46

## 2019-03-19 RX ADMIN — CLINDAMYCIN PHOSPHATE SCH MLS/HR: 12 INJECTION, SOLUTION INTRAVENOUS at 13:14

## 2019-03-19 RX ADMIN — OXYCODONE AND ACETAMINOPHEN PRN TAB: 5; 325 TABLET ORAL at 21:59

## 2019-03-19 RX ADMIN — OXYCODONE AND ACETAMINOPHEN PRN TAB: 5; 325 TABLET ORAL at 10:48

## 2019-03-19 NOTE — PDOC PROGRESS REPORT
Subjective


Progress Note for:: 03/19/19


Subjective:: 





Patient denies any complaints.  He would like to be discharged and go home so he

can go back to work.  Is a returning back to work pretty much the day he is 

discharged.  Denies any serous drainage coming from the wound.  His pain is well

controlled.  Has been afebrile overnight.


Reason For Visit: 


LEFT INFECTED WITH GANGRENOUS FOOT








Physical Exam


Vital Signs: 


                                        











Temp Pulse Resp BP Pulse Ox


 


 98.8 F   88   19   133/69 H  95 


 


 03/19/19 15:54  03/19/19 15:54  03/19/19 15:54  03/19/19 15:54  03/19/19 15:54








                                 Intake & Output











 03/18/19 03/19/19 03/20/19





 06:59 06:59 06:59


 


Intake Total 2990 2016 582


 


Output Total 2820  


 


Balance 170 2016 582


 


Weight 76.6 kg 77.6 kg 











General appearance: PRESENT: no acute distress, cooperative


Mouth exam: PRESENT: moist, neck supple


Neck exam: PRESENT: lymphadenopathy.  ABSENT: JVD, thyromegaly, tracheal 

deviation


Respiratory exam: PRESENT: clear to auscultation lorenzo.  ABSENT: accessory muscle 

use


Cardiovascular exam: PRESENT: RRR


Musculoskeletal exam: PRESENT: ambulatory, full ROM - dressing intact, minimal 

drainage on dressing


Neurological exam: PRESENT: alert, oriented to person, oriented to place, 

oriented to time, oriented to situation


Skin exam: PRESENT: dry, warm





Results


Laboratory Results: 


                                        





                                 03/17/19 07:00 





                                 03/17/19 07:00 





                                        





03/14/19 13:57   Blood   Blood Culture - Final


                            NO GROWTH IN 5 DAYS


03/14/19 12:10   Blood   Blood Culture - Final


                            NO GROWTH IN 5 DAYS





                                        











  03/14/19 03/14/19





  11:00 11:00


 


Creatine Kinase  28 L 


 


CK-MB (CK-2)   0.34


 


Troponin I   < 0.012











Impressions: 


                                        





Chest X-Ray  03/14/19 11:26


IMPRESSION:  DIFFUSE INTERSTITIAL SCARRING.  NO ACUTE RADIOGRAPHIC FINDING IN T

HE CHEST.


 








Foot X-Ray  03/14/19 11:28


IMPRESSION:  SUPERFICIAL SOFT TISSUE ULCERATION ON THE LATERAL IMAGE.  ON THE AP

AND OBLIQUE IMAGES THERE IS PATCHY OSTEOPENIA AND INDISTINCT APPEARANCE OF THE 

CORTEX AT THE BASE OF THE PROXIMAL PHALANX OF THE 3RD AND 4TH TOE.  SIMILAR 

APPEARANCE INVOLVING THE HEAD OF THE 3RD AND 4TH METATARSALS AND POSSIBLY THE 

2ND.  FINDINGS SUSPICIOUS FOR OSTEOMYELITIS.  FURTHER EVALUATION WITH MRI OF THE

FOOT MAY BE INDICATED.


 














Assessment and Plan





- Diagnosis


(1) Left infected gangrenous foot


Is this a current diagnosis for this admission?: Yes   


Plan: 





03/19/19 17:40


Continue clindamycin based on cultures and sensitivity antibiotic is 

appropriate.  Continue daily dressing changes.








(2) Alcoholism /alcohol abuse


Is this a current diagnosis for this admission?: Yes   


Plan: 





03/19/19 17:42


Continue CIWA scores.








(3) Hypertension


Qualifiers: 


   Hypertension type: essential hypertension   Qualified Code(s): I10 - 

Essential (primary) hypertension   


Is this a current diagnosis for this admission?: Yes   


Plan: 





03/19/19 17:41


Blood pressure has been well controlled during his admission.  He will need a 

short-term follow-up with his PCP after discharge to ensure compliance with his 

blood pressure medications.








(4) Leukocytosis


Is this a current diagnosis for this admission?: Yes   


Plan: 





03/19/19 17:41


Slight increase in leukocytosis this morning we will continue to trend see how 

he does has been afebrile overnight so we will continue with current 

antibiotics.








(5) Uncontrolled diabetes mellitus


Qualifiers: 


   Diabetes mellitus type: type 2 


Is this a current diagnosis for this admission?: Yes   


Plan: 





03/19/19 17:41


Continue sliding scale and fingerstick glucose will monitor.  She will need a 

short-term follow-up with his PCP to maintain control of his diabetes after disc

harge.








- Time


Time Spent with patient: 25-34 minutes


Medications reviewed and adjusted accordingly: Yes


Anticipated discharge: Home

## 2019-03-20 LAB
ADD MANUAL DIFF: NO
ALBUMIN SERPL-MCNC: 2.3 G/DL (ref 3.5–5)
ALP SERPL-CCNC: 128 U/L (ref 38–126)
ALT SERPL-CCNC: 25 U/L (ref 21–72)
ANION GAP SERPL CALC-SCNC: 8 MMOL/L (ref 5–19)
AST SERPL-CCNC: 21 U/L (ref 17–59)
BASOPHILS # BLD AUTO: 0.1 10^3/UL (ref 0–0.2)
BASOPHILS NFR BLD AUTO: 0.3 % (ref 0–2)
BILIRUB DIRECT SERPL-MCNC: 0.2 MG/DL (ref 0–0.4)
BILIRUB SERPL-MCNC: 0.4 MG/DL (ref 0.2–1.3)
BUN SERPL-MCNC: 13 MG/DL (ref 7–20)
CALCIUM: 8.6 MG/DL (ref 8.4–10.2)
CHLORIDE SERPL-SCNC: 101 MMOL/L (ref 98–107)
CO2 SERPL-SCNC: 26 MMOL/L (ref 22–30)
EOSINOPHIL # BLD AUTO: 0.2 10^3/UL (ref 0–0.6)
EOSINOPHIL NFR BLD AUTO: 1.2 % (ref 0–6)
ERYTHROCYTE [DISTWIDTH] IN BLOOD BY AUTOMATED COUNT: 12.5 % (ref 11.5–14)
GLUCOSE SERPL-MCNC: 181 MG/DL (ref 75–110)
HCT VFR BLD CALC: 29.7 % (ref 37.9–51)
HGB BLD-MCNC: 10.3 G/DL (ref 13.5–17)
LYMPHOCYTES # BLD AUTO: 1.3 10^3/UL (ref 0.5–4.7)
LYMPHOCYTES NFR BLD AUTO: 7.7 % (ref 13–45)
MCH RBC QN AUTO: 34.6 PG (ref 27–33.4)
MCHC RBC AUTO-ENTMCNC: 34.6 G/DL (ref 32–36)
MCV RBC AUTO: 100 FL (ref 80–97)
MONOCYTES # BLD AUTO: 1.2 10^3/UL (ref 0.1–1.4)
MONOCYTES NFR BLD AUTO: 7.2 % (ref 3–13)
NEUTROPHILS # BLD AUTO: 14.1 10^3/UL (ref 1.7–8.2)
NEUTS SEG NFR BLD AUTO: 83.6 % (ref 42–78)
PLATELET # BLD: 513 10^3/UL (ref 150–450)
POTASSIUM SERPL-SCNC: 4.4 MMOL/L (ref 3.6–5)
PROT SERPL-MCNC: 6.4 G/DL (ref 6.3–8.2)
RBC # BLD AUTO: 2.97 10^6/UL (ref 4.35–5.55)
SODIUM SERPL-SCNC: 135.4 MMOL/L (ref 137–145)
TOTAL CELLS COUNTED % (AUTO): 100 %
WBC # BLD AUTO: 16.8 10^3/UL (ref 4–10.5)

## 2019-03-20 RX ADMIN — INSULIN LISPRO SCH UNIT: 100 INJECTION, SOLUTION INTRAVENOUS; SUBCUTANEOUS at 18:09

## 2019-03-20 RX ADMIN — FAMOTIDINE SCH MG: 20 TABLET, FILM COATED ORAL at 21:47

## 2019-03-20 RX ADMIN — OXYCODONE AND ACETAMINOPHEN PRN TAB: 5; 325 TABLET ORAL at 06:21

## 2019-03-20 RX ADMIN — OXYCODONE AND ACETAMINOPHEN PRN TAB: 5; 325 TABLET ORAL at 18:08

## 2019-03-20 RX ADMIN — ENOXAPARIN SODIUM SCH MG: 40 INJECTION SUBCUTANEOUS at 09:14

## 2019-03-20 RX ADMIN — CLINDAMYCIN PHOSPHATE SCH MLS/HR: 12 INJECTION, SOLUTION INTRAVENOUS at 06:21

## 2019-03-20 RX ADMIN — FAMOTIDINE SCH MG: 20 TABLET, FILM COATED ORAL at 09:14

## 2019-03-20 RX ADMIN — OXYCODONE AND ACETAMINOPHEN PRN TAB: 5; 325 TABLET ORAL at 12:40

## 2019-03-20 RX ADMIN — OXYCODONE AND ACETAMINOPHEN PRN TAB: 5; 325 TABLET ORAL at 21:46

## 2019-03-20 RX ADMIN — DIAZEPAM SCH MG: 5 TABLET ORAL at 06:21

## 2019-03-20 RX ADMIN — INSULIN HUMAN SCH UNIT: 100 INJECTION, SUSPENSION SUBCUTANEOUS at 09:14

## 2019-03-20 RX ADMIN — INSULIN LISPRO SCH UNIT: 100 INJECTION, SOLUTION INTRAVENOUS; SUBCUTANEOUS at 12:38

## 2019-03-20 RX ADMIN — CLINDAMYCIN PHOSPHATE SCH MLS/HR: 12 INJECTION, SOLUTION INTRAVENOUS at 14:15

## 2019-03-20 RX ADMIN — INSULIN HUMAN SCH UNIT: 100 INJECTION, SUSPENSION SUBCUTANEOUS at 18:09

## 2019-03-20 RX ADMIN — DIAZEPAM SCH MG: 5 TABLET ORAL at 14:15

## 2019-03-20 RX ADMIN — INSULIN LISPRO SCH UNIT: 100 INJECTION, SOLUTION INTRAVENOUS; SUBCUTANEOUS at 09:14

## 2019-03-20 RX ADMIN — CLINDAMYCIN PHOSPHATE SCH MLS/HR: 12 INJECTION, SOLUTION INTRAVENOUS at 21:46

## 2019-03-20 RX ADMIN — SODIUM CHLORIDE, SODIUM LACTATE, POTASSIUM CHLORIDE, AND CALCIUM CHLORIDE PRN MLS/HR: .6; .31; .03; .02 INJECTION, SOLUTION INTRAVENOUS at 18:10

## 2019-03-20 RX ADMIN — DIAZEPAM SCH MG: 5 TABLET ORAL at 21:46

## 2019-03-20 RX ADMIN — DOCUSATE SODIUM SCH MG: 100 CAPSULE, LIQUID FILLED ORAL at 09:14

## 2019-03-20 RX ADMIN — INSULIN LISPRO SCH: 100 INJECTION, SOLUTION INTRAVENOUS; SUBCUTANEOUS at 21:50

## 2019-03-20 RX ADMIN — DOCUSATE SODIUM SCH MG: 100 CAPSULE, LIQUID FILLED ORAL at 18:08

## 2019-03-20 RX ADMIN — INSULIN LISPRO SCH: 100 INJECTION, SOLUTION INTRAVENOUS; SUBCUTANEOUS at 01:24

## 2019-03-20 NOTE — PDOC PROGRESS REPORT
Subjective


Progress Note for:: 03/20/19


Reason For Visit: 


LEFT INFECTED WITH GANGRENOUS FOOT








Physical Exam


Vital Signs: 


                                        











Temp Pulse Resp BP Pulse Ox


 


 99.8 F   105 H  18   137/80 H  97 


 


 03/20/19 07:47  03/20/19 07:47  03/20/19 07:47  03/20/19 07:47  03/20/19 07:47








                                 Intake & Output











 03/19/19 03/20/19 03/21/19





 06:59 06:59 06:59


 


Intake Total 2016 1682 


 


Balance 2016 1682 


 


Weight 77.6 kg 75.3 kg 














Results


Laboratory Results: 


                                        





                                 03/20/19 06:53 





                                 03/20/19 06:53 





                                        











  03/20/19 03/20/19





  06:53 06:53


 


WBC  16.8 H 


 


RBC  2.97 L 


 


Hgb  10.3 L 


 


Hct  29.7 L 


 


MCV  100 H 


 


MCH  34.6 H 


 


MCHC  34.6 


 


RDW  12.5 


 


Plt Count  513 H 


 


Seg Neutrophils %  83.6 H 


 


Lymphocytes %  7.7 L 


 


Monocytes %  7.2 


 


Eosinophils %  1.2 


 


Basophils %  0.3 


 


Absolute Neutrophils  14.1 H 


 


Absolute Lymphocytes  1.3 


 


Absolute Monocytes  1.2 


 


Absolute Eosinophils  0.2 


 


Absolute Basophils  0.1 


 


Sodium   135.4 L


 


Potassium   4.4


 


Chloride   101


 


Carbon Dioxide   26


 


Anion Gap   8


 


BUN   13


 


Creatinine   0.99


 


Est GFR ( Amer)   > 60


 


Est GFR (Non-Af Amer)   > 60


 


Glucose   181 H


 


Calcium   8.6


 


Total Bilirubin   0.4


 


AST   21


 


ALT   25


 


Alkaline Phosphatase   128 H


 


Total Protein   6.4


 


Albumin   2.3 L








                                        





03/14/19 13:57   Blood   Blood Culture - Final


                            NO GROWTH IN 5 DAYS


03/14/19 12:10   Blood   Blood Culture - Final


                            NO GROWTH IN 5 DAYS





                                        











  03/14/19 03/14/19





  11:00 11:00


 


Creatine Kinase  28 L 


 


CK-MB (CK-2)   0.34


 


Troponin I   < 0.012











Impressions: 


                                        





Chest X-Ray  03/14/19 11:26


IMPRESSION:  DIFFUSE INTERSTITIAL SCARRING.  NO ACUTE RADIOGRAPHIC FINDING IN 

THE CHEST.


 








Foot X-Ray  03/14/19 11:28


IMPRESSION:  SUPERFICIAL SOFT TISSUE ULCERATION ON THE LATERAL IMAGE.  ON THE AP

AND OBLIQUE IMAGES THERE IS PATCHY OSTEOPENIA AND INDISTINCT APPEARANCE OF THE 

CORTEX AT THE BASE OF THE PROXIMAL PHALANX OF THE 3RD AND 4TH TOE.  SIMILAR 

APPEARANCE INVOLVING THE HEAD OF THE 3RD AND 4TH METATARSALS AND POSSIBLY THE 

2ND.  FINDINGS SUSPICIOUS FOR OSTEOMYELITIS.  FURTHER EVALUATION WITH MRI OF THE

FOOT MAY BE INDICATED.


 














Assessment & Plan





- Diagnosis


(1) Left infected gangrenous foot


Is this a current diagnosis for this admission?: Yes   





(2) Uncontrolled diabetes mellitus


Qualifiers: 


   Diabetes mellitus type: type 2 


Is this a current diagnosis for this admission?: Yes   





- Plan Summary


Plan Summary: 





47 y/o diabetic male s/p left toe amputation.  The patient has continued 

necrosis of tissue of the foot as well as purulent material emanating from the 

dorsum of the foot.  I believe the patient's foot is unsalvageable, and he will 

require below-knee amputation.  The patient is unwilling to agree to this.  At 

the very least, the patient will require further debridement for all the 

necrotic tissue and the infected fluid emanating from the dorsum of the foot.  I

will make the patient n.p.o. after midnight.  Plan for further incision, 

drainage, and debridement tomorrow.

## 2019-03-20 NOTE — PDOC PROGRESS REPORT
Subjective


Progress Note for:: 03/20/19


Subjective:: 





Patient denies any complaints.  He would like to be discharged and go home so he

can go back to work.  Is a returning back to work pretty much the day he is 

discharged.  Denies any serous drainage coming from the wound.  His pain is well

controlled.  Has been afebrile overnight.


Reason For Visit: 


LEFT INFECTED WITH GANGRENOUS FOOT








Physical Exam


Vital Signs: 


                                        











Temp Pulse Resp BP Pulse Ox


 


 98.2 F   82   16   113/64   97 


 


 03/20/19 12:00  03/20/19 12:00  03/20/19 12:00  03/20/19 12:00  03/20/19 12:00








                                 Intake & Output











 03/19/19 03/20/19 03/21/19





 06:59 06:59 06:59


 


Intake Total 2016 1682 50


 


Balance 2016 1682 50


 


Weight 77.6 kg 75.3 kg 











General appearance: PRESENT: no acute distress, cooperative


Eye exam: ABSENT: scleral icterus


Neck exam: ABSENT: JVD, thyromegaly, tracheal deviation


Cardiovascular exam: PRESENT: RRR.  ABSENT: gallop, rubs


GI/Abdominal exam: PRESENT: normal bowel sounds, soft.  ABSENT: tenderness


Extremities exam: ABSENT: pedal edema


Neurological exam: PRESENT: alert, oriented to person, oriented to place, 

oriented to time, oriented to situation


Skin exam: PRESENT: dry, normal color, warm





Results


Laboratory Results: 


                                        





                                 03/20/19 06:53 





                                 03/20/19 06:53 





                                        











  03/20/19 03/20/19





  06:53 06:53


 


WBC  16.8 H 


 


RBC  2.97 L 


 


Hgb  10.3 L 


 


Hct  29.7 L 


 


MCV  100 H 


 


MCH  34.6 H 


 


MCHC  34.6 


 


RDW  12.5 


 


Plt Count  513 H 


 


Seg Neutrophils %  83.6 H 


 


Lymphocytes %  7.7 L 


 


Monocytes %  7.2 


 


Eosinophils %  1.2 


 


Basophils %  0.3 


 


Absolute Neutrophils  14.1 H 


 


Absolute Lymphocytes  1.3 


 


Absolute Monocytes  1.2 


 


Absolute Eosinophils  0.2 


 


Absolute Basophils  0.1 


 


Sodium   135.4 L


 


Potassium   4.4


 


Chloride   101


 


Carbon Dioxide   26


 


Anion Gap   8


 


BUN   13


 


Creatinine   0.99


 


Est GFR ( Amer)   > 60


 


Est GFR (Non-Af Amer)   > 60


 


Glucose   181 H


 


Calcium   8.6


 


Total Bilirubin   0.4


 


AST   21


 


ALT   25


 


Alkaline Phosphatase   128 H


 


Total Protein   6.4


 


Albumin   2.3 L








                                        





03/14/19 13:57   Blood   Blood Culture - Final


                            NO GROWTH IN 5 DAYS


03/14/19 12:10   Blood   Blood Culture - Final


                            NO GROWTH IN 5 DAYS





                                        











  03/14/19 03/14/19





  11:00 11:00


 


Creatine Kinase  28 L 


 


CK-MB (CK-2)   0.34


 


Troponin I   < 0.012











Impressions: 


                                        





Chest X-Ray  03/14/19 11:26


IMPRESSION:  DIFFUSE INTERSTITIAL SCARRING.  NO ACUTE RADIOGRAPHIC FINDING IN 

THE CHEST.


 








Foot X-Ray  03/14/19 11:28


IMPRESSION:  SUPERFICIAL SOFT TISSUE ULCERATION ON THE LATERAL IMAGE.  ON THE AP

AND OBLIQUE IMAGES THERE IS PATCHY OSTEOPENIA AND INDISTINCT APPEARANCE OF THE 

CORTEX AT THE BASE OF THE PROXIMAL PHALANX OF THE 3RD AND 4TH TOE.  SIMILAR 

APPEARANCE INVOLVING THE HEAD OF THE 3RD AND 4TH METATARSALS AND POSSIBLY THE 

2ND.  FINDINGS SUSPICIOUS FOR OSTEOMYELITIS.  FURTHER EVALUATION WITH MRI OF THE

FOOT MAY BE INDICATED.


 














Assessment and Plan





- Diagnosis


(1) Left infected gangrenous foot


Is this a current diagnosis for this admission?: Yes   


Plan: 





03/19/19 17:40


Continue clindamycin based on cultures and sensitivity antibiotic is 

appropriate.  Continue daily dressing changes.








(2) Alcoholism /alcohol abuse


Is this a current diagnosis for this admission?: Yes   


Plan: 





03/19/19 17:42


Continue CIWA scores.








(3) Hypertension


Qualifiers: 


   Hypertension type: essential hypertension   Qualified Code(s): I10 - 

Essential (primary) hypertension   


Is this a current diagnosis for this admission?: Yes   


Plan: 





03/19/19 17:41


Blood pressure has been well controlled during his admission.  He will need a 

short-term follow-up with his PCP after discharge to ensure compliance with his 

blood pressure medications.








(4) Leukocytosis


Is this a current diagnosis for this admission?: Yes   


Plan: 





03/19/19 17:41


Slight increase in leukocytosis this morning we will continue to trend see how 

he does has been afebrile overnight so we will continue with current 

antibiotics.








(5) Uncontrolled diabetes mellitus


Qualifiers: 


   Diabetes mellitus type: type 2 


Is this a current diagnosis for this admission?: Yes   


Plan: 





03/19/19 17:41


Continue sliding scale and fingerstick glucose will monitor.  She will need a 

short-term follow-up with his PCP to maintain control of his diabetes after 

discharge.








- Time


Time Spent with patient: 25-34 minutes


Medications reviewed and adjusted accordingly: Yes

## 2019-03-20 NOTE — PDOC PROGRESS REPORT
Subjective


Progress Note for:: 03/19/19


Subjective:: 





no pains


Reason For Visit: 


LEFT INFECTED WITH GANGRENOUS FOOT








Physical Exam


Vital Signs: 


                                        











Temp Pulse Resp BP Pulse Ox


 


 101.7 F H  104 H  18   138/75 H  91 L


 


 03/19/19 23:55  03/19/19 23:55  03/19/19 23:55  03/19/19 23:55  03/19/19 23:55








                                 Intake & Output











 03/18/19 03/19/19 03/20/19





 06:59 06:59 06:59


 


Intake Total 2990 2016 1682


 


Output Total 2820  


 


Balance 170 2016 1682


 


Weight 76.6 kg 77.6 kg 











Exam: 





left foot remains swollen but warm.


Debrided some necrotic tissue.


Foot does not look to be getting better


May benefit with wound vac. Will re-evaluate tomorrow





Results


Laboratory Results: 


                                        





                                 03/17/19 07:00 





                                 03/17/19 07:00 





                                        





03/14/19 13:57   Blood   Blood Culture - Final


                            NO GROWTH IN 5 DAYS


03/14/19 12:10   Blood   Blood Culture - Final


                            NO GROWTH IN 5 DAYS





                                        











  03/14/19 03/14/19





  11:00 11:00


 


Creatine Kinase  28 L 


 


CK-MB (CK-2)   0.34


 


Troponin I   < 0.012











Impressions: 


                                        





Chest X-Ray  03/14/19 11:26


IMPRESSION:  DIFFUSE INTERSTITIAL SCARRING.  NO ACUTE RADIOGRAPHIC FINDING IN 

THE CHEST.


 








Foot X-Ray  03/14/19 11:28


IMPRESSION:  SUPERFICIAL SOFT TISSUE ULCERATION ON THE LATERAL IMAGE.  ON THE AP

AND OBLIQUE IMAGES THERE IS PATCHY OSTEOPENIA AND INDISTINCT APPEARANCE OF THE 

CORTEX AT THE BASE OF THE PROXIMAL PHALANX OF THE 3RD AND 4TH TOE.  SIMILAR 

APPEARANCE INVOLVING THE HEAD OF THE 3RD AND 4TH METATARSALS AND POSSIBLY THE 

2ND.  FINDINGS SUSPICIOUS FOR OSTEOMYELITIS.  FURTHER EVALUATION WITH MRI OF THE

FOOT MAY BE INDICATED.

## 2019-03-21 RX ADMIN — FAMOTIDINE SCH MG: 20 TABLET, FILM COATED ORAL at 23:13

## 2019-03-21 RX ADMIN — INSULIN HUMAN SCH UNIT: 100 INJECTION, SUSPENSION SUBCUTANEOUS at 17:47

## 2019-03-21 RX ADMIN — OXYCODONE AND ACETAMINOPHEN PRN TAB: 5; 325 TABLET ORAL at 11:51

## 2019-03-21 RX ADMIN — DIAZEPAM SCH MG: 5 TABLET ORAL at 06:15

## 2019-03-21 RX ADMIN — FAMOTIDINE SCH MG: 20 TABLET, FILM COATED ORAL at 11:49

## 2019-03-21 RX ADMIN — INSULIN LISPRO SCH UNIT: 100 INJECTION, SOLUTION INTRAVENOUS; SUBCUTANEOUS at 23:14

## 2019-03-21 RX ADMIN — OXYCODONE AND ACETAMINOPHEN PRN TAB: 5; 325 TABLET ORAL at 06:21

## 2019-03-21 RX ADMIN — INSULIN HUMAN SCH: 100 INJECTION, SUSPENSION SUBCUTANEOUS at 11:45

## 2019-03-21 RX ADMIN — ENOXAPARIN SODIUM SCH MG: 40 INJECTION SUBCUTANEOUS at 11:50

## 2019-03-21 RX ADMIN — DOCUSATE SODIUM SCH MG: 100 CAPSULE, LIQUID FILLED ORAL at 17:47

## 2019-03-21 RX ADMIN — INSULIN LISPRO SCH: 100 INJECTION, SOLUTION INTRAVENOUS; SUBCUTANEOUS at 12:31

## 2019-03-21 RX ADMIN — CLINDAMYCIN PHOSPHATE SCH MLS/HR: 12 INJECTION, SOLUTION INTRAVENOUS at 23:13

## 2019-03-21 RX ADMIN — INSULIN LISPRO SCH: 100 INJECTION, SOLUTION INTRAVENOUS; SUBCUTANEOUS at 11:45

## 2019-03-21 RX ADMIN — INSULIN LISPRO SCH UNIT: 100 INJECTION, SOLUTION INTRAVENOUS; SUBCUTANEOUS at 17:48

## 2019-03-21 RX ADMIN — OXYCODONE AND ACETAMINOPHEN PRN TAB: 5; 325 TABLET ORAL at 23:22

## 2019-03-21 RX ADMIN — CLINDAMYCIN PHOSPHATE SCH MLS/HR: 12 INJECTION, SOLUTION INTRAVENOUS at 06:15

## 2019-03-21 RX ADMIN — CLINDAMYCIN PHOSPHATE SCH MLS/HR: 12 INJECTION, SOLUTION INTRAVENOUS at 14:42

## 2019-03-21 RX ADMIN — DOCUSATE SODIUM SCH MG: 100 CAPSULE, LIQUID FILLED ORAL at 11:49

## 2019-03-21 NOTE — PDOC PROGRESS REPORT
Subjective


Progress Note for:: 03/21/19


Subjective:: 





Denies significant pains


Reason For Visit: 


LEFT INFECTED WITH GANGRENOUS FOOT








Physical Exam


Vital Signs: 


                                        











Temp Pulse Resp BP Pulse Ox


 


 98.5 F   96   18   134/67 H  96 


 


 03/21/19 08:00  03/21/19 08:00  03/21/19 08:00  03/21/19 08:00  03/21/19 08:00








                                 Intake & Output











 03/20/19 03/21/19 03/22/19





 06:59 06:59 06:59


 


Intake Total 1682 2690 50


 


Balance 1682 2690 50


 


Weight 75.3 kg 75.3 kg 











Exam: 





Left foot wound looks essentially same. Upper wound with starting granulation 

but prox side has some dusky discoloration.


Refuses any BKA. Told me he will "kill himself" if gets an amputation





Results


Laboratory Results: 


                                        





                                 03/20/19 06:53 





                                 03/20/19 06:53 





                                        











  03/14/19 03/14/19





  11:00 11:00


 


Creatine Kinase  28 L 


 


CK-MB (CK-2)   0.34


 


Troponin I   < 0.012











Impressions: 


                                        





Chest X-Ray  03/14/19 11:26


IMPRESSION:  DIFFUSE INTERSTITIAL SCARRING.  NO ACUTE RADIOGRAPHIC FINDING IN 

THE CHEST.


 








Foot X-Ray  03/14/19 11:28


IMPRESSION:  SUPERFICIAL SOFT TISSUE ULCERATION ON THE LATERAL IMAGE.  ON THE AP

AND OBLIQUE IMAGES THERE IS PATCHY OSTEOPENIA AND INDISTINCT APPEARANCE OF THE 

CORTEX AT THE BASE OF THE PROXIMAL PHALANX OF THE 3RD AND 4TH TOE.  SIMILAR 

APPEARANCE INVOLVING THE HEAD OF THE 3RD AND 4TH METATARSALS AND POSSIBLY THE 

2ND.  FINDINGS SUSPICIOUS FOR OSTEOMYELITIS.  FURTHER EVALUATION WITH MRI OF THE

FOOT MAY BE INDICATED.


 














Assessment & Plan





- Time


Time Spent with patient: 15-24 minutes





- Inpatient Certification


Medical Necessity: Need for IV Antibiotics, Risk of Complication if Not Cared 

For in Hospital





- Plan Summary


Plan Summary: 





Try wound vac. This will give him the best non surgical therapy at this time 

then monitor wound status closely


Continue antibiotics

## 2019-03-21 NOTE — PDOC PROGRESS REPORT
Subjective


Progress Note for:: 03/21/19


Subjective:: 





No adverse events overnight.  No new complaints.  Pain is been well controlled. 

Eating and drinking without difficulty.  He said he did not take his diabetes 

medicine for 3 or 4 years for "stupid reasons."  He feels like he basically 

ignored the problem with his foot because he did not want to admit anything was 

wrong.


Reason For Visit: 


LEFT INFECTED WITH GANGRENOUS FOOT








Physical Exam


Vital Signs: 


                                        











Temp Pulse Resp BP Pulse Ox


 


 98.9 F   95   18   140/68 H  95 


 


 03/21/19 16:00  03/21/19 16:00  03/21/19 16:00  03/21/19 16:00  03/21/19 16:00








                                 Intake & Output











 03/20/19 03/21/19 03/22/19





 06:59 06:59 06:59


 


Intake Total 1682 2690 1150


 


Balance 1682 2690 1150


 


Weight 75.3 kg 75.3 kg 








General appearance: PRESENT: no acute distress, cooperative


Eye exam: ABSENT: scleral icterus


Neck exam: ABSENT: JVD, thyromegaly, tracheal deviation


Cardiovascular exam: PRESENT: RRR.  ABSENT: gallop, rubs


GI/Abdominal exam: PRESENT: normal bowel sounds, soft.  ABSENT: tenderness


Extremities exam: He has a wound VAC on his foot over the area where his middle 

3 toes should be.  There is an area of denuded skin laterally with eschar, as 

well as an area of denuded skin on the dorsal aspect of the left foot.


Neurological exam: PRESENT: alert, oriented to person, oriented to place, 

oriented to time, oriented to situation


Skin exam: PRESENT: dry, normal color, warm





Results


Laboratory Results: 


                                        





                                 03/20/19 06:53 





                                 03/20/19 06:53 





                                        











  03/14/19 03/14/19





  11:00 11:00


 


Creatine Kinase  28 L 


 


CK-MB (CK-2)   0.34


 


Troponin I   < 0.012











Impressions: 


                                        





Chest X-Ray  03/14/19 11:26


IMPRESSION:  DIFFUSE INTERSTITIAL SCARRING.  NO ACUTE RADIOGRAPHIC FINDING IN 

THE CHEST.


 








Foot X-Ray  03/14/19 11:28


IMPRESSION:  SUPERFICIAL SOFT TISSUE ULCERATION ON THE LATERAL IMAGE.  ON THE AP

AND OBLIQUE IMAGES THERE IS PATCHY OSTEOPENIA AND INDISTINCT APPEARANCE OF THE 

CORTEX AT THE BASE OF THE PROXIMAL PHALANX OF THE 3RD AND 4TH TOE.  SIMILAR 

APPEARANCE INVOLVING THE HEAD OF THE 3RD AND 4TH METATARSALS AND POSSIBLY THE 

2ND.  FINDINGS SUSPICIOUS FOR OSTEOMYELITIS.  FURTHER EVALUATION WITH MRI OF THE

FOOT MAY BE INDICATED.


 














Assessment and Plan





- Diagnosis


(1) Left infected gangrenous foot


Is this a current diagnosis for this admission?: Yes   


Plan: 


Wound VAC is in place.  Antibiotics are ordered.  Surgical consult has already 

debrided the foot once, and was possibly going to do it again.  Surgery has 

recommended that he have a below the knee irritation but the patient is 

refusing.








- Time


Time Spent with patient: 25-34 minutes

## 2019-03-22 LAB
ANION GAP SERPL CALC-SCNC: 10 MMOL/L (ref 5–19)
BUN SERPL-MCNC: 16 MG/DL (ref 7–20)
CALCIUM: 9.1 MG/DL (ref 8.4–10.2)
CHLORIDE SERPL-SCNC: 101 MMOL/L (ref 98–107)
CO2 SERPL-SCNC: 26 MMOL/L (ref 22–30)
ERYTHROCYTE [DISTWIDTH] IN BLOOD BY AUTOMATED COUNT: 12.7 % (ref 11.5–14)
GLUCOSE SERPL-MCNC: 196 MG/DL (ref 75–110)
HCT VFR BLD CALC: 29.9 % (ref 37.9–51)
HGB BLD-MCNC: 10.3 G/DL (ref 13.5–17)
MCH RBC QN AUTO: 34.7 PG (ref 27–33.4)
MCHC RBC AUTO-ENTMCNC: 34.3 G/DL (ref 32–36)
MCV RBC AUTO: 101 FL (ref 80–97)
PLATELET # BLD: 526 10^3/UL (ref 150–450)
POTASSIUM SERPL-SCNC: 4.8 MMOL/L (ref 3.6–5)
RBC # BLD AUTO: 2.96 10^6/UL (ref 4.35–5.55)
SODIUM SERPL-SCNC: 136.7 MMOL/L (ref 137–145)
WBC # BLD AUTO: 11.7 10^3/UL (ref 4–10.5)

## 2019-03-22 RX ADMIN — INSULIN LISPRO SCH: 100 INJECTION, SOLUTION INTRAVENOUS; SUBCUTANEOUS at 08:10

## 2019-03-22 RX ADMIN — FAMOTIDINE SCH MG: 20 TABLET, FILM COATED ORAL at 10:00

## 2019-03-22 RX ADMIN — CLINDAMYCIN PHOSPHATE SCH MLS/HR: 12 INJECTION, SOLUTION INTRAVENOUS at 06:03

## 2019-03-22 RX ADMIN — FAMOTIDINE SCH MG: 20 TABLET, FILM COATED ORAL at 22:55

## 2019-03-22 RX ADMIN — CLINDAMYCIN PHOSPHATE SCH MLS/HR: 12 INJECTION, SOLUTION INTRAVENOUS at 22:55

## 2019-03-22 RX ADMIN — DOCUSATE SODIUM SCH MG: 100 CAPSULE, LIQUID FILLED ORAL at 10:00

## 2019-03-22 RX ADMIN — INSULIN HUMAN SCH UNIT: 100 INJECTION, SUSPENSION SUBCUTANEOUS at 08:14

## 2019-03-22 RX ADMIN — OXYCODONE AND ACETAMINOPHEN PRN TAB: 5; 325 TABLET ORAL at 13:40

## 2019-03-22 RX ADMIN — INSULIN LISPRO SCH: 100 INJECTION, SOLUTION INTRAVENOUS; SUBCUTANEOUS at 12:35

## 2019-03-22 RX ADMIN — INSULIN LISPRO SCH UNIT: 100 INJECTION, SOLUTION INTRAVENOUS; SUBCUTANEOUS at 22:55

## 2019-03-22 RX ADMIN — OXYCODONE AND ACETAMINOPHEN PRN TAB: 5; 325 TABLET ORAL at 18:03

## 2019-03-22 RX ADMIN — OXYCODONE AND ACETAMINOPHEN PRN TAB: 5; 325 TABLET ORAL at 06:05

## 2019-03-22 RX ADMIN — INSULIN HUMAN SCH UNIT: 100 INJECTION, SUSPENSION SUBCUTANEOUS at 17:00

## 2019-03-22 RX ADMIN — INSULIN LISPRO SCH UNIT: 100 INJECTION, SOLUTION INTRAVENOUS; SUBCUTANEOUS at 17:00

## 2019-03-22 RX ADMIN — ENOXAPARIN SODIUM SCH MG: 40 INJECTION SUBCUTANEOUS at 10:00

## 2019-03-22 RX ADMIN — DOCUSATE SODIUM SCH MG: 100 CAPSULE, LIQUID FILLED ORAL at 17:00

## 2019-03-22 RX ADMIN — CLINDAMYCIN PHOSPHATE SCH MLS/HR: 12 INJECTION, SOLUTION INTRAVENOUS at 13:39

## 2019-03-22 NOTE — PDOC PROGRESS REPORT
Subjective


Progress Note for:: 03/22/19


Subjective:: 





Denies significant pains


Reason For Visit: 


LEFT INFECTED WITH GANGRENOUS FOOT








Physical Exam


Vital Signs: 


                                        











Temp Pulse Resp BP Pulse Ox


 


 98.8 F   95   18   132/75 H  98 


 


 03/22/19 16:00  03/22/19 16:00  03/22/19 16:00  03/22/19 16:00  03/22/19 16:00








                                 Intake & Output











 03/21/19 03/22/19 03/23/19





 06:59 06:59 06:59


 


Intake Total 2690 1680 1150


 


Output Total  500 


 


Balance 2690 1180 1150


 


Weight 75.3 kg 75.3 kg 75.3 kg











Exam: 





Wound vac on the left foot in place. Left foot remains warm and less edematous





Results


Laboratory Results: 


                                        





                                 03/22/19 09:32 





                                 03/22/19 09:32 





                                        











  03/22/19 03/22/19





  09:32 09:32


 


WBC  11.7 H 


 


RBC  2.96 L 


 


Hgb  10.3 L 


 


Hct  29.9 L 


 


MCV  101 H 


 


MCH  34.7 H 


 


MCHC  34.3 


 


RDW  12.7 


 


Plt Count  526 H 


 


Sodium   136.7 L


 


Potassium   4.8


 


Chloride   101


 


Carbon Dioxide   26


 


Anion Gap   10


 


BUN   16


 


Creatinine   1.22


 


Est GFR ( Amer)   > 60


 


Est GFR (Non-Af Amer)   > 60


 


Glucose   196 H


 


Calcium   9.1








                                        











  03/14/19 03/14/19





  11:00 11:00


 


Creatine Kinase  28 L 


 


CK-MB (CK-2)   0.34


 


Troponin I   < 0.012











Impressions: 


                                        





Chest X-Ray  03/14/19 11:26


IMPRESSION:  DIFFUSE INTERSTITIAL SCARRING.  NO ACUTE RADIOGRAPHIC FINDING IN 

THE CHEST.


 








Foot X-Ray  03/14/19 11:28


IMPRESSION:  SUPERFICIAL SOFT TISSUE ULCERATION ON THE LATERAL IMAGE.  ON THE AP

AND OBLIQUE IMAGES THERE IS PATCHY OSTEOPENIA AND INDISTINCT APPEARANCE OF THE 

CORTEX AT THE BASE OF THE PROXIMAL PHALANX OF THE 3RD AND 4TH TOE.  SIMILAR 

APPEARANCE INVOLVING THE HEAD OF THE 3RD AND 4TH METATARSALS AND POSSIBLY THE 

2ND.  FINDINGS SUSPICIOUS FOR OSTEOMYELITIS.  FURTHER EVALUATION WITH MRI OF THE

FOOT MAY BE INDICATED.


 














Assessment & Plan





- Time


Time Spent with patient: 15-24 minutes





- Inpatient Certification


Medical Necessity: Need for IV Antibiotics, Risk of Complication if Not Cared 

For in Hospital





- Plan Summary


Plan Summary: 





Continue IV antibiotics


Day 2 of wound vac. Can replace VAC over the weekend

## 2019-03-22 NOTE — PDOC PROGRESS REPORT
Subjective


Progress Note for:: 03/22/19


Subjective:: 





No adverse events overnight.  He is still adamant that he does not want his foot

amputated.  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 

VAC on.  He became very emotional at the idea of not being able to work.


Reason For Visit: 


LEFT INFECTED WITH GANGRENOUS FOOT








Physical Exam


Vital Signs: 


                                        











Temp Pulse Resp BP Pulse Ox


 


 98.8 F   95   18   132/75 H  98 


 


 03/22/19 16:00  03/22/19 16:00  03/22/19 16:00  03/22/19 16:00  03/22/19 16:00








                                 Intake & Output











 03/21/19 03/22/19 03/23/19





 06:59 06:59 06:59


 


Intake Total 2690 1680 1150


 


Output Total  500 


 


Balance 2690 1180 1150


 


Weight 75.3 kg 75.3 kg 75.3 kg








General appearance: PRESENT: no acute distress, cooperative


Eye exam: ABSENT: scleral icterus


Neck exam: ABSENT: JVD, thyromegaly, tracheal deviation


Cardiovascular exam: PRESENT: RRR.  ABSENT: gallop, rubs


GI/Abdominal exam: PRESENT: normal bowel sounds, soft.  ABSENT: tenderness


Extremities exam: He has a wound VAC on his foot over the area where his middle 

3 toes should be.  There is an area of denuded skin laterally with eschar, as 

well as an area of denuded skin on the dorsal aspect of the left foot.


Neurological exam: PRESENT: alert, oriented to person, oriented to place, 

oriented to time, oriented to situation


Skin exam: PRESENT: dry, normal color, warm





Results


Laboratory Results: 


                                        





                                 03/22/19 09:32 





                                 03/22/19 09:32 





                                        











  03/22/19 03/22/19





  09:32 09:32


 


WBC  11.7 H 


 


RBC  2.96 L 


 


Hgb  10.3 L 


 


Hct  29.9 L 


 


MCV  101 H 


 


MCH  34.7 H 


 


MCHC  34.3 


 


RDW  12.7 


 


Plt Count  526 H 


 


Sodium   136.7 L


 


Potassium   4.8


 


Chloride   101


 


Carbon Dioxide   26


 


Anion Gap   10


 


BUN   16


 


Creatinine   1.22


 


Est GFR ( Amer)   > 60


 


Est GFR (Non-Af Amer)   > 60


 


Glucose   196 H


 


Calcium   9.1








                                        











  03/14/19 03/14/19





  11:00 11:00


 


Creatine Kinase  28 L 


 


CK-MB (CK-2)   0.34


 


Troponin I   < 0.012











Impressions: 


                                        





Chest X-Ray  03/14/19 11:26


IMPRESSION:  DIFFUSE INTERSTITIAL SCARRING.  NO ACUTE RADIOGRAPHIC FINDING IN 

THE CHEST.


 








Foot X-Ray  03/14/19 11:28


IMPRESSION:  SUPERFICIAL SOFT TISSUE ULCERATION ON THE LATERAL IMAGE.  ON THE AP

AND OBLIQUE IMAGES THERE IS PATCHY OSTEOPENIA AND INDISTINCT APPEARANCE OF THE 

CORTEX AT THE BASE OF THE PROXIMAL PHALANX OF THE 3RD AND 4TH TOE.  SIMILAR 

APPEARANCE INVOLVING THE HEAD OF THE 3RD AND 4TH METATARSALS AND POSSIBLY THE 

2ND.  FINDINGS SUSPICIOUS FOR OSTEOMYELITIS.  FURTHER EVALUATION WITH MRI OF THE

FOOT MAY BE INDICATED.


 














Assessment and Plan





- Diagnosis


(1) Left infected gangrenous foot


Is this a current diagnosis for this admission?: Yes   


Plan: 


Wound VAC is in place.  Antibiotics are ordered.  Surgical consult has already 

debrided the foot once, and was possibly going to do it again.  Surgery has 

recommended that he have a below the knee irritation but the patient is 

refusing.  He works full-time but does not have insurance, and we are trying to 

see if we can put him on oral antibiotics and somehow get a wound VAC for him.  

They we will have to arrange the follow-up that he will need.








- Time


Time Spent with patient: 25-34 minutes

## 2019-03-23 RX ADMIN — DOCUSATE SODIUM SCH MG: 100 CAPSULE, LIQUID FILLED ORAL at 11:03

## 2019-03-23 RX ADMIN — OXYCODONE AND ACETAMINOPHEN PRN TAB: 5; 325 TABLET ORAL at 05:48

## 2019-03-23 RX ADMIN — FAMOTIDINE SCH MG: 20 TABLET, FILM COATED ORAL at 22:23

## 2019-03-23 RX ADMIN — INSULIN LISPRO SCH UNIT: 100 INJECTION, SOLUTION INTRAVENOUS; SUBCUTANEOUS at 22:24

## 2019-03-23 RX ADMIN — INSULIN LISPRO SCH UNIT: 100 INJECTION, SOLUTION INTRAVENOUS; SUBCUTANEOUS at 07:41

## 2019-03-23 RX ADMIN — CLINDAMYCIN PHOSPHATE SCH MLS/HR: 12 INJECTION, SOLUTION INTRAVENOUS at 05:46

## 2019-03-23 RX ADMIN — INSULIN HUMAN SCH: 100 INJECTION, SUSPENSION SUBCUTANEOUS at 17:08

## 2019-03-23 RX ADMIN — INSULIN LISPRO SCH UNIT: 100 INJECTION, SOLUTION INTRAVENOUS; SUBCUTANEOUS at 12:14

## 2019-03-23 RX ADMIN — CLINDAMYCIN PHOSPHATE SCH MLS/HR: 12 INJECTION, SOLUTION INTRAVENOUS at 22:23

## 2019-03-23 RX ADMIN — DOCUSATE SODIUM SCH MG: 100 CAPSULE, LIQUID FILLED ORAL at 17:45

## 2019-03-23 RX ADMIN — ENOXAPARIN SODIUM SCH MG: 40 INJECTION SUBCUTANEOUS at 11:03

## 2019-03-23 RX ADMIN — FAMOTIDINE SCH MG: 20 TABLET, FILM COATED ORAL at 11:03

## 2019-03-23 RX ADMIN — OXYCODONE AND ACETAMINOPHEN PRN TAB: 5; 325 TABLET ORAL at 22:23

## 2019-03-23 RX ADMIN — INSULIN HUMAN SCH UNIT: 100 INJECTION, SUSPENSION SUBCUTANEOUS at 07:42

## 2019-03-23 RX ADMIN — INSULIN LISPRO SCH: 100 INJECTION, SOLUTION INTRAVENOUS; SUBCUTANEOUS at 17:07

## 2019-03-23 RX ADMIN — CLINDAMYCIN PHOSPHATE SCH MLS/HR: 12 INJECTION, SOLUTION INTRAVENOUS at 14:41

## 2019-03-23 RX ADMIN — OXYCODONE AND ACETAMINOPHEN PRN TAB: 5; 325 TABLET ORAL at 13:16

## 2019-03-23 NOTE — PDOC PROGRESS REPORT
Subjective


Progress Note for:: 03/23/19


Subjective:: 





No adverse events overnight.  No new complaints.  No fevers.  He continues to 

refuse amputation of was left of his foot.


Reason For Visit: 


LEFT INFECTED WITH GANGRENOUS FOOT








Physical Exam


Vital Signs: 


                                        











Temp Pulse Resp BP Pulse Ox


 


 98.1 F   89   16   140/78 H  96 


 


 03/23/19 11:37  03/23/19 11:37  03/23/19 11:37  03/23/19 11:37  03/23/19 11:37








                                 Intake & Output











 03/22/19 03/23/19 03/24/19





 06:59 06:59 06:59


 


Intake Total 1680 1350 50


 


Output Total 500  


 


Balance 1180 1350 50


 


Weight 75.3 kg 75.3 kg 








General appearance: PRESENT: no acute distress, cooperative


Eye exam: ABSENT: scleral icterus


Neck exam: ABSENT: JVD, thyromegaly, tracheal deviation


Cardiovascular exam: PRESENT: RRR.  ABSENT: gallop, rubs


GI/Abdominal exam: PRESENT: normal bowel sounds, soft.  ABSENT: tenderness


Extremities exam: He has a wound VAC on his foot over the area where his middle 

3 toes should be.  There is an area of denuded skin laterally with eschar, as 

well as an area of denuded skin on the dorsal aspect of the left foot.


Neurological exam: PRESENT: alert, oriented to person, oriented to place, 

oriented to time, oriented to situation


Skin exam: PRESENT: dry, normal color, warm





Results


Laboratory Results: 


                                        





                                 03/22/19 09:32 





                                 03/22/19 09:32 





                                        











  03/14/19 03/14/19





  11:00 11:00


 


Creatine Kinase  28 L 


 


CK-MB (CK-2)   0.34


 


Troponin I   < 0.012











Impressions: 


                                        





Chest X-Ray  03/14/19 11:26


IMPRESSION:  DIFFUSE INTERSTITIAL SCARRING.  NO ACUTE RADIOGRAPHIC FINDING IN 

THE CHEST.


 








Foot X-Ray  03/14/19 11:28


IMPRESSION:  SUPERFICIAL SOFT TISSUE ULCERATION ON THE LATERAL IMAGE.  ON THE AP

AND OBLIQUE IMAGES THERE IS PATCHY OSTEOPENIA AND INDISTINCT APPEARANCE OF THE 

CORTEX AT THE BASE OF THE PROXIMAL PHALANX OF THE 3RD AND 4TH TOE.  SIMILAR 

APPEARANCE INVOLVING THE HEAD OF THE 3RD AND 4TH METATARSALS AND POSSIBLY THE 

2ND.  FINDINGS SUSPICIOUS FOR OSTEOMYELITIS.  FURTHER EVALUATION WITH MRI OF THE

FOOT MAY BE INDICATED.


 














Assessment and Plan





- Diagnosis


(1) Left infected gangrenous foot


Is this a current diagnosis for this admission?: Yes   


Plan: 


Wound VAC is in place.  Antibiotics are ordered.  Surgical consult is apparently

planning to remove the wound VAC for another bedside debridement today, but he 

would likely continue to progress to the point where he will need some form of 

amputation.  He is continuing to refuse it.  He wants to try oral antibiotics 

and a wound VAC at home.  He works full-time but does not have insurance, and we

are trying to see if we can put him on oral antibiotics and somehow get a wound 

VAC for him.  They we will have to arrange the follow-up that he will need.








- Time


Time Spent with patient: 25-34 minutes

## 2019-03-23 NOTE — PDOC PROGRESS REPORT
Subjective


Progress Note for:: 03/23/19


Reason For Visit: 


LEFT INFECTED WITH GANGRENOUS FOOT








Physical Exam


Vital Signs: 


                                        











Temp Pulse Resp BP Pulse Ox


 


 98.3 F   87   16   144/78 H  97 


 


 03/23/19 08:08  03/23/19 08:08  03/23/19 08:08  03/23/19 08:08  03/23/19 08:08








                                 Intake & Output











 03/22/19 03/23/19 03/24/19





 06:59 06:59 06:59


 


Intake Total 1680 1350 


 


Output Total 500  


 


Balance 1180 1350 


 


Weight 75.3 kg 75.3 kg 











General appearance: PRESENT: no acute distress


Head exam: PRESENT: normocephalic


Mouth exam: PRESENT: moist


Neck exam: PRESENT: full ROM


Respiratory exam: PRESENT: clear to auscultation lorenzo


Cardiovascular exam: PRESENT: RRR


Pulses: PRESENT: +1 pedal pulses bilateral


GI/Abdominal exam: PRESENT: soft


Rectal exam: PRESENT: deferred


Extremities exam: PRESENT: +1 edema


Neurological exam: PRESENT: alert, awake, oriented to person, oriented to time, 

oriented to situation


Psychiatric exam: PRESENT: appropriate affect


Focused psych exam: ABSENT: internal stimuli - left foot with wound vac in p

lace, removed areas of necrosis over sub tissue medial aspect of 1st metatarsal 

debrided,  but with min bleeding despite palp dp puse





Results


Laboratory Results: 


                                        





                                 03/22/19 09:32 





                                 03/22/19 09:32 





                                        











  03/14/19 03/14/19





  11:00 11:00


 


Creatine Kinase  28 L 


 


CK-MB (CK-2)   0.34


 


Troponin I   < 0.012











Impressions: 


                                        





Chest X-Ray  03/14/19 11:26


IMPRESSION:  DIFFUSE INTERSTITIAL SCARRING.  NO ACUTE RADIOGRAPHIC FINDING IN 

THE CHEST.


 








Foot X-Ray  03/14/19 11:28


IMPRESSION:  SUPERFICIAL SOFT TISSUE ULCERATION ON THE LATERAL IMAGE.  ON THE AP

AND OBLIQUE IMAGES THERE IS PATCHY OSTEOPENIA AND INDISTINCT APPEARANCE OF THE 

CORTEX AT THE BASE OF THE PROXIMAL PHALANX OF THE 3RD AND 4TH TOE.  SIMILAR 

APPEARANCE INVOLVING THE HEAD OF THE 3RD AND 4TH METATARSALS AND POSSIBLY THE 

2ND.  FINDINGS SUSPICIOUS FOR OSTEOMYELITIS.  FURTHER EVALUATION WITH MRI OF THE

FOOT MAY BE INDICATED.


 














Assessment & Plan





- Diagnosis


(1) Left infected gangrenous foot


Is this a current diagnosis for this admission?: Yes   





- Plan Summary


Plan Summary: 





wound vac with min effect, removed


medial aspect of forefoot debrided of necrotic tissue


back  to pink tissue, howver min bleeding


I suspect pt will need amputation of 1st toe, however


this will leave him with only 5th toe


there he would benifit with forefoot amputation]


currently he is refusiing 


wll dc wound vac for now and


cont wtih  tid dressing changes


will reeval in am


will cont to discuss forefoot amp with pt.

## 2019-03-24 PROCEDURE — 0Y6N0ZB DETACHMENT AT LEFT FOOT, PARTIAL 2ND RAY, OPEN APPROACH: ICD-10-PCS | Performed by: SURGERY

## 2019-03-24 PROCEDURE — 0Y6N0ZC DETACHMENT AT LEFT FOOT, PARTIAL 3RD RAY, OPEN APPROACH: ICD-10-PCS | Performed by: SURGERY

## 2019-03-24 PROCEDURE — 0Y6N0Z9 DETACHMENT AT LEFT FOOT, PARTIAL 1ST RAY, OPEN APPROACH: ICD-10-PCS | Performed by: SURGERY

## 2019-03-24 PROCEDURE — 0Y6N0ZF DETACHMENT AT LEFT FOOT, PARTIAL 5TH RAY, OPEN APPROACH: ICD-10-PCS | Performed by: SURGERY

## 2019-03-24 PROCEDURE — 0Y6N0ZD DETACHMENT AT LEFT FOOT, PARTIAL 4TH RAY, OPEN APPROACH: ICD-10-PCS | Performed by: SURGERY

## 2019-03-24 RX ADMIN — INSULIN HUMAN SCH UNIT: 100 INJECTION, SUSPENSION SUBCUTANEOUS at 07:52

## 2019-03-24 RX ADMIN — ENOXAPARIN SODIUM SCH: 40 INJECTION SUBCUTANEOUS at 10:00

## 2019-03-24 RX ADMIN — CLINDAMYCIN PHOSPHATE SCH MLS/HR: 12 INJECTION, SOLUTION INTRAVENOUS at 05:16

## 2019-03-24 RX ADMIN — INSULIN HUMAN SCH: 100 INJECTION, SUSPENSION SUBCUTANEOUS at 18:59

## 2019-03-24 RX ADMIN — DOCUSATE SODIUM SCH: 100 CAPSULE, LIQUID FILLED ORAL at 10:03

## 2019-03-24 RX ADMIN — INSULIN LISPRO SCH: 100 INJECTION, SOLUTION INTRAVENOUS; SUBCUTANEOUS at 18:59

## 2019-03-24 RX ADMIN — INSULIN LISPRO SCH: 100 INJECTION, SOLUTION INTRAVENOUS; SUBCUTANEOUS at 11:45

## 2019-03-24 RX ADMIN — OXYCODONE AND ACETAMINOPHEN PRN TAB: 5; 325 TABLET ORAL at 05:16

## 2019-03-24 RX ADMIN — OXYCODONE AND ACETAMINOPHEN PRN TAB: 5; 325 TABLET ORAL at 22:24

## 2019-03-24 RX ADMIN — FAMOTIDINE SCH MG: 20 TABLET, FILM COATED ORAL at 22:24

## 2019-03-24 RX ADMIN — FAMOTIDINE SCH: 20 TABLET, FILM COATED ORAL at 10:03

## 2019-03-24 RX ADMIN — INSULIN LISPRO SCH UNIT: 100 INJECTION, SOLUTION INTRAVENOUS; SUBCUTANEOUS at 07:52

## 2019-03-24 RX ADMIN — DOCUSATE SODIUM SCH: 100 CAPSULE, LIQUID FILLED ORAL at 17:39

## 2019-03-24 RX ADMIN — INSULIN LISPRO SCH UNIT: 100 INJECTION, SOLUTION INTRAVENOUS; SUBCUTANEOUS at 22:25

## 2019-03-24 NOTE — PDOC PROGRESS REPORT
Subjective


Progress Note for:: 03/24/19


Reason For Visit: 


LEFT INFECTED WITH GANGRENOUS FOOT








Physical Exam


Vital Signs: 


                                        











Temp Pulse Resp BP Pulse Ox


 


 98.0 F   99   16   149/89 H  97 


 


 03/23/19 23:56  03/23/19 23:56  03/23/19 23:56  03/23/19 23:56  03/23/19 23:56








                                 Intake & Output











 03/23/19 03/24/19 03/25/19





 06:59 06:59 06:59


 


Intake Total 1350 1494 


 


Output Total  400 


 


Balance 1350 1094 


 


Weight 75.3 kg 75.3 kg 











General appearance: PRESENT: no acute distress


Head exam: PRESENT: atraumatic


Eye exam: PRESENT: EOMI


Mouth exam: PRESENT: moist


Respiratory exam: PRESENT: clear to auscultation lorenzo


Cardiovascular exam: PRESENT: RRR


Extremities exam: PRESENT: other - left forefoot still with drainage of pus from

medial aspect of  left great toe no improvment with wet to dry dressing


Neurological exam: PRESENT: alert, awake, oriented to person, oriented to place,

oriented to time, oriented to situation


Skin exam: PRESENT: dry





Results


Laboratory Results: 


                                        





                                 03/22/19 09:32 





                                 03/22/19 09:32 





                                        











  03/14/19 03/14/19





  11:00 11:00


 


Creatine Kinase  28 L 


 


CK-MB (CK-2)   0.34


 


Troponin I   < 0.012











Impressions: 


                                        





Chest X-Ray  03/14/19 11:26


IMPRESSION:  DIFFUSE INTERSTITIAL SCARRING.  NO ACUTE RADIOGRAPHIC FINDING IN 

THE CHEST.


 








Foot X-Ray  03/14/19 11:28


IMPRESSION:  SUPERFICIAL SOFT TISSUE ULCERATION ON THE LATERAL IMAGE.  ON THE AP

AND OBLIQUE IMAGES THERE IS PATCHY OSTEOPENIA AND INDISTINCT APPEARANCE OF THE 

CORTEX AT THE BASE OF THE PROXIMAL PHALANX OF THE 3RD AND 4TH TOE.  SIMILAR 

APPEARANCE INVOLVING THE HEAD OF THE 3RD AND 4TH METATARSALS AND POSSIBLY THE 

2ND.  FINDINGS SUSPICIOUS FOR OSTEOMYELITIS.  FURTHER EVALUATION WITH MRI OF THE

FOOT MAY BE INDICATED.


 














Assessment & Plan





- Diagnosis


(1) Left infected gangrenous foot


Is this a current diagnosis for this admission?: Yes   





- Plan Summary


Plan Summary: 





still with drainage of pus from left forefoot


no improvment with vac or with wet to dry dressing changes


discussed left bka pt refuses


however he would consent to forefoot amputation


I discussed risks including possible not being able to


control infection with just forefoot amp


and difficulty with healing and


orthotic use


he understands and agreed to amputation of 


forefoot.

## 2019-03-24 NOTE — PDOC PROGRESS REPORT
Subjective


Progress Note for:: 03/24/19


Subjective:: 





No adverse events overnight.  No new complaints.  No fevers.  He agreed today to

a partial amputation of his left foot.  He is still pretty depressed about the 

idea of losing part of his foot.  His chief concern is being able to get back to

work.


Reason For Visit: 


LEFT INFECTED WITH GANGRENOUS FOOT








Physical Exam


Vital Signs: 


                                        











Temp Pulse Resp BP Pulse Ox


 


 97.9 F   84   20   134/88 H  100 


 


 03/24/19 12:12  03/24/19 12:12  03/24/19 12:12  03/24/19 12:12  03/24/19 12:12








                                 Intake & Output











 03/23/19 03/24/19 03/25/19





 06:59 06:59 06:59


 


Intake Total 1350 1494 


 


Output Total  400 


 


Balance 1350 1094 


 


Weight 75.3 kg 75.3 kg 








General appearance: PRESENT: no acute distress, cooperative


Eye exam: ABSENT: scleral icterus


Neck exam: ABSENT: JVD, thyromegaly, tracheal deviation


Cardiovascular exam: PRESENT: RRR.  ABSENT: gallop, rubs


GI/Abdominal exam: PRESENT: normal bowel sounds, soft.  ABSENT: tenderness


Extremities exam: He has a clean gauze bandage over the left foot


Neurological exam: PRESENT: alert, oriented to person, oriented to place, 

oriented to time, oriented to situation


Skin exam: PRESENT: dry, normal color, warm





Results


Laboratory Results: 


                                        





                                 03/22/19 09:32 





                                 03/22/19 09:32 





                                        











  03/14/19 03/14/19





  11:00 11:00


 


Creatine Kinase  28 L 


 


CK-MB (CK-2)   0.34


 


Troponin I   < 0.012











Impressions: 


                                        





Chest X-Ray  03/14/19 11:26


IMPRESSION:  DIFFUSE INTERSTITIAL SCARRING.  NO ACUTE RADIOGRAPHIC FINDING IN 

THE CHEST.


 








Foot X-Ray  03/14/19 11:28


IMPRESSION:  SUPERFICIAL SOFT TISSUE ULCERATION ON THE LATERAL IMAGE.  ON THE AP

AND OBLIQUE IMAGES THERE IS PATCHY OSTEOPENIA AND INDISTINCT APPEARANCE OF THE 

CORTEX AT THE BASE OF THE PROXIMAL PHALANX OF THE 3RD AND 4TH TOE.  SIMILAR 

APPEARANCE INVOLVING THE HEAD OF THE 3RD AND 4TH METATARSALS AND POSSIBLY THE 

2ND.  FINDINGS SUSPICIOUS FOR OSTEOMYELITIS.  FURTHER EVALUATION WITH MRI OF THE

FOOT MAY BE INDICATED.


 














Assessment and Plan





- Diagnosis


(1) Left infected gangrenous foot


Is this a current diagnosis for this admission?: Yes   


Plan: 


We got him on empiric antibiotic coverage.  Surgery is been consulted.  He 

agreed to a partial amputation of his left foot today because he still had some 

pus coming out of his foot where it had been debrided.








- Time


Time Spent with patient: 25-34 minutes

## 2019-03-25 RX ADMIN — INSULIN LISPRO SCH UNIT: 100 INJECTION, SOLUTION INTRAVENOUS; SUBCUTANEOUS at 18:17

## 2019-03-25 RX ADMIN — ENOXAPARIN SODIUM SCH MG: 40 INJECTION SUBCUTANEOUS at 10:32

## 2019-03-25 RX ADMIN — DOCUSATE SODIUM SCH MG: 100 CAPSULE, LIQUID FILLED ORAL at 10:32

## 2019-03-25 RX ADMIN — INSULIN LISPRO SCH: 100 INJECTION, SOLUTION INTRAVENOUS; SUBCUTANEOUS at 08:34

## 2019-03-25 RX ADMIN — OXYCODONE AND ACETAMINOPHEN PRN TAB: 5; 325 TABLET ORAL at 18:16

## 2019-03-25 RX ADMIN — NYSTATIN SCH UNIT: 100000 SUSPENSION ORAL at 23:40

## 2019-03-25 RX ADMIN — FAMOTIDINE SCH MG: 20 TABLET, FILM COATED ORAL at 22:24

## 2019-03-25 RX ADMIN — NYSTATIN SCH UNIT: 100000 SUSPENSION ORAL at 18:18

## 2019-03-25 RX ADMIN — INSULIN HUMAN SCH UNIT: 100 INJECTION, SUSPENSION SUBCUTANEOUS at 08:41

## 2019-03-25 RX ADMIN — OXYCODONE AND ACETAMINOPHEN PRN TAB: 5; 325 TABLET ORAL at 04:34

## 2019-03-25 RX ADMIN — NYSTATIN SCH UNIT: 100000 SUSPENSION ORAL at 15:00

## 2019-03-25 RX ADMIN — FAMOTIDINE SCH MG: 20 TABLET, FILM COATED ORAL at 10:32

## 2019-03-25 RX ADMIN — DOCUSATE SODIUM SCH MG: 100 CAPSULE, LIQUID FILLED ORAL at 18:16

## 2019-03-25 RX ADMIN — NYSTATIN SCH UNIT: 100000 SUSPENSION ORAL at 06:12

## 2019-03-25 RX ADMIN — INSULIN LISPRO SCH: 100 INJECTION, SOLUTION INTRAVENOUS; SUBCUTANEOUS at 13:01

## 2019-03-25 RX ADMIN — OXYCODONE AND ACETAMINOPHEN PRN TAB: 5; 325 TABLET ORAL at 10:34

## 2019-03-25 RX ADMIN — INSULIN HUMAN SCH UNIT: 100 INJECTION, SUSPENSION SUBCUTANEOUS at 18:17

## 2019-03-25 RX ADMIN — INSULIN LISPRO SCH: 100 INJECTION, SOLUTION INTRAVENOUS; SUBCUTANEOUS at 22:14

## 2019-03-25 RX ADMIN — LEVOFLOXACIN SCH MLS/HR: 750 INJECTION, SOLUTION INTRAVENOUS at 18:16

## 2019-03-25 NOTE — PSYCHOLOGICAL NOTE
Psych Note





- Psych Note


Date seen by psych provider: 03/25/19


Time seen by psych provider: 13:25 - Evaluation from [id number].


Psych Note: 


Reason for Consult: SI





Contact Permissions: Friend or Boss possibly. 





Patient is a 48 year old male who presented to the ED on 03/14/19 for being 

noncompliant with diabetic medications, drinking heavily the last couple years 

and concern for left foot infection (ulcerated scores, black discoloration, 

severe pain) which started 6 weeks prior. He was subsequently admitted to 

hospitalist services for left infected gangrenous foot, leukocytosis, 

uncontrolled Diabetes Mellitus, alcoholism/alcohol abuse and HTN. He had to have

surgery on foot due to threatened forefoot in need of urgent operative 

debridement. He required partial amputation of foot even after initial surgery, 

which he agreed to and took place yesterday (03/24/19 at 1820). Documentation 

noted he was pretty depressed about the idea of losing part of his foot and had 

concern for being able to go back to work (future.forward/goal oriented 

thinking). Patient asked "why do I need a gun, no I don't own one or have one, I

have never used a gun in my life." When asking about if he recalled his 

statements or felt that way he said "what kind of shit, I don't want to hear 

that." He acknowledged "when people are in pain, they hurt, get mad and can say 

anything." He stated he was in pain all day and night and wanted pain 

medication. He was made aware he is on a timed schedule for pain medication. He 

stated his boss would be helping him with everything. 





Patient was alert and oriented to self, person, place, time and situation. Mood 

was depressed with irritable affect. He denied SI/HI. He did not appear to be 

responding to internal stimuli as evidenced by fair eye contact, answering 

questions appropriately when addressed, staying on topic and carrying on some 

dialogue conversation. Thought processes were linear. Conversational speech was 

within normal limits for rate, tone and prosody. Intellectual abilities are 

estimated to be average. Insight, judgment and impulse control were fair as 

evidenced by recognizing people say things when they are in pain, hurting and 

mad.  





Attending nurse stated the surgeon reported while patient was coming out of an

esthesia he stated he wished he had a gun to end it all. She noted otherwise the

only thing he said was he felt like he was going to die because he asked for 

pain medication before the time of administration (on timed schedule).  





Diagnosis:


Partial amputation of left foot on 03/24/19


311 (F32.9) Unspecified Depressive Disorder


303.90 (F10.20) Alcohol Use Disorder, Moderate 





Impression/Plan: Patient is cleared from acute psychiatric services. He denied 

SI/HI and no observed psychosis. If he is interested in treatment for alcohol 

behavioral health can link him to [facility] services. Consulted with Dr. Patten 

regarding the management and care of patient. Attending Hospitalist made aware 

of recommendations.

## 2019-03-25 NOTE — OPERATIVE REPORT E
Operative Report



NAME: BRIANDA SNOW

MRN:  [id number]          : 1970 AGE:  48Y

DATE OF SURGERY: 2019                        ROOM: 436



PREOPERATIVE DIAGNOSIS:

Left diabetic foot infection.



POSTOPERATIVE DIAGNOSIS:

Left diabetic foot infection.



OPERATIVE PROCEDURE:

Left transmetatarsal amputation.



SURGEON:

BACILIO SHEPARD M.D.



INDICATIONS FOR OPERATION:

This is a 48-year-old male with a history of diabetes who was admitted to

the hospital approximately 5 days ago with a diabetic foot infection.  He

underwent a pre-toe amputation.  His second, third, and fourth toe were

amputated and he has been monitored on the floor with dressing changes and

IV antibiotics.  However, there is still persistent purulent material

coming from the medial aspect of the left great toe that is extending up

the forefoot, and for this reason, he was offered a left below-the-knee

amputation; however, he refused.  After a long discussion with the patient

and him understanding that a transmetatarsal amputation may not heal, he

still wanted to undergo that as opposed to a left below-the-knee

amputation.  Therefore, he was scheduled for this procedure.



PROCEDURE:

The patient was brought to the operating room in awake, alert, and stable

condition.  A spinal anesthetic was placed and he was placed in a prone

position and the left leg was prepped and draped in the usual sterile

manner for the procedure.  At the base of the great toe on the left, a

fishmouth incision was made in the dorsum of the foot and extended around

the lateral aspect of the foot and underneath to the plantar aspect of the

foot, leaving the plantar skin flap a little bit longer than the dorsal

skin flap.  Once this was completed, we mobilized the periosteum up off

all 4 metatarsals to the proximal portion of all 4 metatarsals and they

were all divided with the bone cutter.  The tendon and connective tissue

were also divided sharply with either a 10 blade or the Piedra scissors and

the specimen was removed.  Upon removing the specimen, there was still

some dusky-looking muscle on the medial aspect of the left first

metatarsal and this was dissected back proximally above the joint with

sharp dissection until we obtained some good margins and relatively pink

muscle.  There was some bleeding from the skin edges and the deep tissue,

however, it was not vigorous.  There was one small artery that required

suture ligature with 2-0 Vicryl suture, which controlled the bleeding. 

Once this was done, we mobilized the soft tissue underneath the dorsal and

plantar flaps with Piedra scissors to obtain a fishmouth type closure.  Once

all bleeding was controlled and all marginal tissue was debrided with

sharp dissection, we then irrigated the wound with copious irrigation and

normal saline using Asepto syringe.  We then reapproximated the deep

fascia with interrupted placed 2-0 Polysorb sutures.  I left 3 separate

Penrose drains in the deep tissue and brought them out through the skin

suture line for drainage.  We then closed the dorsal and ventral flaps

with interrupted placed 2-0 nylon sutures.  A sterile dressing and a

compression bandage was then placed at the termination of the operation. 

Estimated blood loss was 50 mL.  Sponge and needle counts were correct x2.

The patient was then transferred to recovery in stable condition.



DICTATING PHYSICIAN:  BACILIO SHEPARD M.D.





[id number]                  DT: 2019    [id number]

PHY#: [id number]            DD: 2019    1838

ID:   [id number]           JOB#: [id number]       ACCT: [id number]



cc:BACILIO SHEPARD M.D.

>

## 2019-03-25 NOTE — PDOC PROGRESS REPORT
Subjective


Progress Note for:: 03/25/19


Subjective:: 





Complaining of foot pain


Reason For Visit: 


LEFT INFECTED WITH GANGRENOUS FOOT








Physical Exam


Vital Signs: 


                                        











Temp Pulse Resp BP Pulse Ox


 


 98.3 F   92   16   116/75   98 


 


 03/25/19 08:00  03/25/19 08:00  03/25/19 08:00  03/25/19 08:00  03/25/19 08:00








                                 Intake & Output











 03/24/19 03/25/19 03/26/19





 06:59 06:59 06:59


 


Intake Total 1494 1627 


 


Output Total 400 1500 


 


Balance 1094 127 


 


Weight 75.3 kg 75.3 kg 











General appearance: PRESENT: no acute distress


Musculoskeletal exam: PRESENT: other - The stump is wrapped.  Dressing dry





Results


Laboratory Results: 


                                        





                                 03/22/19 09:32 





                                 03/22/19 09:32 





                                        











  03/14/19 03/14/19





  11:00 11:00


 


Creatine Kinase  28 L 


 


CK-MB (CK-2)   0.34


 


Troponin I   < 0.012











Impressions: 


                                        





Chest X-Ray  03/14/19 11:26


IMPRESSION:  DIFFUSE INTERSTITIAL SCARRING.  NO ACUTE RADIOGRAPHIC FINDING IN 

THE CHEST.


 








Foot X-Ray  03/14/19 11:28


IMPRESSION:  SUPERFICIAL SOFT TISSUE ULCERATION ON THE LATERAL IMAGE.  ON THE AP

AND OBLIQUE IMAGES THERE IS PATCHY OSTEOPENIA AND INDISTINCT APPEARANCE OF THE 

CORTEX AT THE BASE OF THE PROXIMAL PHALANX OF THE 3RD AND 4TH TOE.  SIMILAR 

APPEARANCE INVOLVING THE HEAD OF THE 3RD AND 4TH METATARSALS AND POSSIBLY THE 

2ND.  FINDINGS SUSPICIOUS FOR OSTEOMYELITIS.  FURTHER EVALUATION WITH MRI OF THE

FOOT MAY BE INDICATED.


 














Assessment & Plan





- Diagnosis


(1) Left infected gangrenous foot


Is this a current diagnosis for this admission?: Yes   


Plan: 


Impression: Patient now 1 day status post high transmetatarsal; reasonable pain 

control.





Recommendations:





1.  Continue current management





2.  Take dressing down tomorrow; reassess level of foot viability.








(2) Alcoholism /alcohol abuse


Is this a current diagnosis for this admission?: Yes   





(3) Hypertension


Qualifiers: 


   Hypertension type: essential hypertension   Qualified Code(s): I10 - Essent

ial (primary) hypertension   


Is this a current diagnosis for this admission?: Yes   





(4) Uncontrolled diabetes mellitus


Qualifiers: 


   Diabetes mellitus type: type 2 


Is this a current diagnosis for this admission?: Yes

## 2019-03-25 NOTE — PDOC PROGRESS REPORT
Subjective


Progress Note for:: 03/25/19


Subjective:: 





No adverse events overnight.  He had his foot surgery today.  He said he was not

sure what the plan was, but I told him that we were going to reassess the foot 

before he goes home to make sure he is going to heal the way we want to, and he 

was satisfied with that.


Reason For Visit: 


LEFT INFECTED WITH GANGRENOUS FOOT








Physical Exam


Vital Signs: 


                                        











Temp Pulse Resp BP Pulse Ox


 


 98.3 F   92   16   116/75   98 


 


 03/25/19 08:00  03/25/19 08:00  03/25/19 08:00  03/25/19 08:00  03/25/19 08:00








                                 Intake & Output











 03/24/19 03/25/19 03/26/19





 06:59 06:59 06:59


 


Intake Total 1494 1627 


 


Output Total 400 1500 


 


Balance 1094 127 


 


Weight 75.3 kg 75.3 kg 








General appearance: PRESENT: no acute distress, cooperative


Eye exam: ABSENT: scleral icterus


Neck exam: ABSENT: JVD, thyromegaly, tracheal deviation


Cardiovascular exam: PRESENT: RRR.  ABSENT: gallop, rubs


GI/Abdominal exam: PRESENT: normal bowel sounds, soft.  ABSENT: tenderness


Extremities exam: He has a clean gauze bandage over the left foot wrapped in C

oban and his forefoot is missing status post partial amputation


Neurological exam: PRESENT: alert, oriented to person, oriented to place, 

oriented to time, oriented to situation


Skin exam: PRESENT: dry, normal color, warm





Results


Laboratory Results: 


                                        





                                 03/22/19 09:32 





                                 03/22/19 09:32 





                                        











  03/14/19 03/14/19





  11:00 11:00


 


Creatine Kinase  28 L 


 


CK-MB (CK-2)   0.34


 


Troponin I   < 0.012











Impressions: 


                                        





Chest X-Ray  03/14/19 11:26


IMPRESSION:  DIFFUSE INTERSTITIAL SCARRING.  NO ACUTE RADIOGRAPHIC FINDING IN 

THE CHEST.


 








Foot X-Ray  03/14/19 11:28


IMPRESSION:  SUPERFICIAL SOFT TISSUE ULCERATION ON THE LATERAL IMAGE.  ON THE AP

AND OBLIQUE IMAGES THERE IS PATCHY OSTEOPENIA AND INDISTINCT APPEARANCE OF THE 

CORTEX AT THE BASE OF THE PROXIMAL PHALANX OF THE 3RD AND 4TH TOE.  SIMILAR 

APPEARANCE INVOLVING THE HEAD OF THE 3RD AND 4TH METATARSALS AND POSSIBLY THE 

2ND.  FINDINGS SUSPICIOUS FOR OSTEOMYELITIS.  FURTHER EVALUATION WITH MRI OF THE

FOOT MAY BE INDICATED.


 














Assessment and Plan





- Diagnosis


(1) Left infected gangrenous foot


Is this a current diagnosis for this admission?: Yes   


Plan: 


We got him on empiric antibiotic coverage.  Surgery is been consulted.  Had a 

partial amputation of his left foot today, and surgery is going to reassess the 

foot later in an attempt to determine viability.








- Time


Time Spent with patient: 25-34 minutes

## 2019-03-25 NOTE — EKG REPORT
SEVERITY:- OTHERWISE NORMAL ECG -

SINUS RHYTHM

BORDERLINE LEFT AXIS DEVIATION

:

Confirmed by: Jake Rose 25-Mar-2019 00:03:51

## 2019-03-26 RX ADMIN — LEVOFLOXACIN SCH MLS/HR: 750 INJECTION, SOLUTION INTRAVENOUS at 18:54

## 2019-03-26 RX ADMIN — NYSTATIN SCH: 100000 SUSPENSION ORAL at 19:37

## 2019-03-26 RX ADMIN — ENOXAPARIN SODIUM SCH MG: 40 INJECTION SUBCUTANEOUS at 09:48

## 2019-03-26 RX ADMIN — FAMOTIDINE SCH MG: 20 TABLET, FILM COATED ORAL at 22:02

## 2019-03-26 RX ADMIN — FAMOTIDINE SCH MG: 20 TABLET, FILM COATED ORAL at 09:48

## 2019-03-26 RX ADMIN — DOCUSATE SODIUM SCH: 100 CAPSULE, LIQUID FILLED ORAL at 09:49

## 2019-03-26 RX ADMIN — INSULIN LISPRO SCH: 100 INJECTION, SOLUTION INTRAVENOUS; SUBCUTANEOUS at 18:47

## 2019-03-26 RX ADMIN — INSULIN LISPRO SCH UNIT: 100 INJECTION, SOLUTION INTRAVENOUS; SUBCUTANEOUS at 12:27

## 2019-03-26 RX ADMIN — NYSTATIN SCH UNIT: 100000 SUSPENSION ORAL at 06:11

## 2019-03-26 RX ADMIN — NYSTATIN SCH UNIT: 100000 SUSPENSION ORAL at 14:08

## 2019-03-26 RX ADMIN — INSULIN LISPRO SCH: 100 INJECTION, SOLUTION INTRAVENOUS; SUBCUTANEOUS at 08:25

## 2019-03-26 RX ADMIN — INSULIN HUMAN SCH: 100 INJECTION, SUSPENSION SUBCUTANEOUS at 08:34

## 2019-03-26 RX ADMIN — OXYCODONE AND ACETAMINOPHEN PRN TAB: 5; 325 TABLET ORAL at 14:08

## 2019-03-26 RX ADMIN — OXYCODONE AND ACETAMINOPHEN PRN TAB: 5; 325 TABLET ORAL at 07:41

## 2019-03-26 RX ADMIN — DOCUSATE SODIUM SCH: 100 CAPSULE, LIQUID FILLED ORAL at 18:58

## 2019-03-26 RX ADMIN — INSULIN HUMAN SCH UNIT: 100 INJECTION, SUSPENSION SUBCUTANEOUS at 18:55

## 2019-03-26 RX ADMIN — INSULIN LISPRO SCH: 100 INJECTION, SOLUTION INTRAVENOUS; SUBCUTANEOUS at 21:37

## 2019-03-26 NOTE — PDOC PROGRESS REPORT
Subjective


Progress Note for:: 03/26/19


Subjective:: 





Patient appears to be comfortable.  He does note pain in his left foot but he 

just had a transmetatarsal amputation.  This is to be expected.  He states that 

he feels much better than at the time of admission.


Reason For Visit: 


LEFT INFECTED WITH GANGRENOUS FOOT








Physical Exam


Vital Signs: 


                                        











Temp Pulse Resp BP Pulse Ox


 


 99.2 F   87   17   125/78   97 


 


 03/26/19 16:07  03/26/19 16:07  03/26/19 16:07  03/26/19 16:07  03/26/19 16:07








                                 Intake & Output











 03/25/19 03/26/19 03/27/19





 06:59 06:59 06:59


 


Intake Total 1627 1916 


 


Output Total 1500 473 


 


Balance 127 1443 


 


Weight 75.3 kg 75 kg 75 kg











General appearance: PRESENT: cooperative, mild distress, well-developed


Head exam: PRESENT: atraumatic, normocephalic


Ear exam: PRESENT: normal external ear exam


Mouth exam: PRESENT: moist, tongue midline


Respiratory exam: PRESENT: clear to auscultation lorenzo, symmetrical, unlabored.  

ABSENT: accessory muscle use, rales, rhonchi, wheezes


Cardiovascular exam: PRESENT: RRR, +S1, +S2


GI/Abdominal exam: PRESENT: normal bowel sounds, soft.  ABSENT: distended, 

tenderness


Rectal exam: PRESENT: deferred


Extremities exam: PRESENT: other - New left transmetatarsal amputation.  ABSENT:

pedal edema


Neurological exam: PRESENT: alert, awake, oriented to person, oriented to place,

oriented to time, oriented to situation, CN II-XII grossly intact


Psychiatric exam: PRESENT: appropriate affect.  ABSENT: agitated, anxious


Focused psych exam: ABSENT: delusional, restlessness





Results


Laboratory Results: 


                                        





                                 03/22/19 09:32 





                                 03/22/19 09:32 





                                        











  03/14/19 03/14/19





  11:00 11:00


 


Creatine Kinase  28 L 


 


CK-MB (CK-2)   0.34


 


Troponin I   < 0.012











Impressions: 


                                        





Chest X-Ray  03/14/19 11:26


IMPRESSION:  DIFFUSE INTERSTITIAL SCARRING.  NO ACUTE RADIOGRAPHIC FINDING IN 

THE CHEST.


 








Foot X-Ray  03/14/19 11:28


IMPRESSION:  SUPERFICIAL SOFT TISSUE ULCERATION ON THE LATERAL IMAGE.  ON THE AP

AND OBLIQUE IMAGES THERE IS PATCHY OSTEOPENIA AND INDISTINCT APPEARANCE OF THE 

CORTEX AT THE BASE OF THE PROXIMAL PHALANX OF THE 3RD AND 4TH TOE.  SIMILAR 

APPEARANCE INVOLVING THE HEAD OF THE 3RD AND 4TH METATARSALS AND POSSIBLY THE 

2ND.  FINDINGS SUSPICIOUS FOR OSTEOMYELITIS.  FURTHER EVALUATION WITH MRI OF THE

FOOT MAY BE INDICATED.


 














Assessment and Plan





- Diagnosis


(1) Left infected gangrenous foot


Is this a current diagnosis for this admission?: Yes   


Plan: 


The patient was placed on IV antibiotics and seen by surgery.  A left 

transmetatarsal amputation was performed.  Surgery is checking on the wound for 

viability with regard to healing.  Patient will likely return home with home 

health.  He can follow-up in the wound care clinic.








(2) Uncontrolled diabetes mellitus


Qualifiers: 


   Diabetes mellitus type: type 2 


Is this a current diagnosis for this admission?: Yes   


Plan: 


On current regimen of NPH and sliding scale the patient has excellent glucose 

control.  Continue current regimen.








(3) Osteomyelitis of left foot


Qualifiers: 


   Osteomyelitis type: other acute   Qualified Code(s): M86.172 - Other acute 

osteomyelitis, left ankle and foot   


Is this a current diagnosis for this admission?: Yes   


Plan: 


With diabetic foot ulcer.  Notes patient has had amputation will likely be able 

to stop antibiotics.  His white blood cell count is improving.  Surgery will 

reassess tomorrow.  If the wound is stable we will establish a discharge plan 

with follow-up.








(4) Hypertension


Qualifiers: 


   Hypertension type: essential hypertension   Qualified Code(s): I10 - 

Essential (primary) hypertension   


Is this a current diagnosis for this admission?: Yes   


Plan: 


Blood pressures appear to be well controlled.  Continue current regimen.








(5) Alcoholism /alcohol abuse


Is this a current diagnosis for this admission?: Yes   


Plan: 


History of alcohol abuse.  We will monitor for withdrawal/delirium tremens.








- Time


Time Spent with patient: Less than 15 minutes


Medications reviewed and adjusted accordingly: Yes


Anticipated discharge: Home with Homehealth

## 2019-03-27 LAB
ADD MANUAL DIFF: NO
ALBUMIN SERPL-MCNC: 2.5 G/DL (ref 3.5–5)
ALP SERPL-CCNC: 85 U/L (ref 38–126)
ALT SERPL-CCNC: 22 U/L (ref 21–72)
ANION GAP SERPL CALC-SCNC: 8 MMOL/L (ref 5–19)
AST SERPL-CCNC: 22 U/L (ref 17–59)
BASOPHILS # BLD AUTO: 0.1 10^3/UL (ref 0–0.2)
BASOPHILS NFR BLD AUTO: 0.8 % (ref 0–2)
BILIRUB DIRECT SERPL-MCNC: 0.2 MG/DL (ref 0–0.4)
BILIRUB SERPL-MCNC: 0.2 MG/DL (ref 0.2–1.3)
BUN SERPL-MCNC: 18 MG/DL (ref 7–20)
CALCIUM: 8.9 MG/DL (ref 8.4–10.2)
CHLORIDE SERPL-SCNC: 107 MMOL/L (ref 98–107)
CO2 SERPL-SCNC: 22 MMOL/L (ref 22–30)
EOSINOPHIL # BLD AUTO: 0.3 10^3/UL (ref 0–0.6)
EOSINOPHIL NFR BLD AUTO: 3.3 % (ref 0–6)
ERYTHROCYTE [DISTWIDTH] IN BLOOD BY AUTOMATED COUNT: 12.5 % (ref 11.5–14)
GLUCOSE SERPL-MCNC: 113 MG/DL (ref 75–110)
HCT VFR BLD CALC: 23.7 % (ref 37.9–51)
HGB BLD-MCNC: 8.3 G/DL (ref 13.5–17)
LYMPHOCYTES # BLD AUTO: 1.6 10^3/UL (ref 0.5–4.7)
LYMPHOCYTES NFR BLD AUTO: 16.3 % (ref 13–45)
MCH RBC QN AUTO: 34.3 PG (ref 27–33.4)
MCHC RBC AUTO-ENTMCNC: 34.9 G/DL (ref 32–36)
MCV RBC AUTO: 98 FL (ref 80–97)
MONOCYTES # BLD AUTO: 0.8 10^3/UL (ref 0.1–1.4)
MONOCYTES NFR BLD AUTO: 7.8 % (ref 3–13)
NEUTROPHILS # BLD AUTO: 6.9 10^3/UL (ref 1.7–8.2)
NEUTS SEG NFR BLD AUTO: 71.8 % (ref 42–78)
PHOSPHATE SERPL-MCNC: 5.9 MG/DL (ref 2.5–4.5)
PLATELET # BLD: 435 10^3/UL (ref 150–450)
POTASSIUM SERPL-SCNC: 4.3 MMOL/L (ref 3.6–5)
PROT SERPL-MCNC: 6.9 G/DL (ref 6.3–8.2)
RBC # BLD AUTO: 2.41 10^6/UL (ref 4.35–5.55)
SODIUM SERPL-SCNC: 136.7 MMOL/L (ref 137–145)
TOTAL CELLS COUNTED % (AUTO): 100 %
WBC # BLD AUTO: 9.7 10^3/UL (ref 4–10.5)

## 2019-03-27 RX ADMIN — LEVOFLOXACIN SCH MLS/HR: 750 INJECTION, SOLUTION INTRAVENOUS at 19:03

## 2019-03-27 RX ADMIN — INSULIN LISPRO SCH: 100 INJECTION, SOLUTION INTRAVENOUS; SUBCUTANEOUS at 08:01

## 2019-03-27 RX ADMIN — DOCUSATE SODIUM SCH MG: 100 CAPSULE, LIQUID FILLED ORAL at 09:42

## 2019-03-27 RX ADMIN — INSULIN HUMAN SCH UNIT: 100 INJECTION, SUSPENSION SUBCUTANEOUS at 19:03

## 2019-03-27 RX ADMIN — NYSTATIN SCH UNIT: 100000 SUSPENSION ORAL at 19:03

## 2019-03-27 RX ADMIN — OXYCODONE AND ACETAMINOPHEN PRN TAB: 5; 325 TABLET ORAL at 00:38

## 2019-03-27 RX ADMIN — DOCUSATE SODIUM SCH: 100 CAPSULE, LIQUID FILLED ORAL at 19:07

## 2019-03-27 RX ADMIN — INSULIN HUMAN SCH: 100 INJECTION, SUSPENSION SUBCUTANEOUS at 08:01

## 2019-03-27 RX ADMIN — ENOXAPARIN SODIUM SCH MG: 40 INJECTION SUBCUTANEOUS at 09:42

## 2019-03-27 RX ADMIN — OXYCODONE AND ACETAMINOPHEN PRN TAB: 5; 325 TABLET ORAL at 09:41

## 2019-03-27 RX ADMIN — INSULIN LISPRO SCH: 100 INJECTION, SOLUTION INTRAVENOUS; SUBCUTANEOUS at 21:19

## 2019-03-27 RX ADMIN — INSULIN LISPRO SCH: 100 INJECTION, SOLUTION INTRAVENOUS; SUBCUTANEOUS at 12:23

## 2019-03-27 RX ADMIN — FAMOTIDINE SCH MG: 20 TABLET, FILM COATED ORAL at 09:42

## 2019-03-27 RX ADMIN — NYSTATIN SCH UNIT: 100000 SUSPENSION ORAL at 05:42

## 2019-03-27 RX ADMIN — NYSTATIN SCH UNIT: 100000 SUSPENSION ORAL at 00:39

## 2019-03-27 RX ADMIN — NYSTATIN SCH UNIT: 100000 SUSPENSION ORAL at 13:33

## 2019-03-27 RX ADMIN — FAMOTIDINE SCH MG: 20 TABLET, FILM COATED ORAL at 21:09

## 2019-03-27 RX ADMIN — INSULIN LISPRO SCH UNIT: 100 INJECTION, SOLUTION INTRAVENOUS; SUBCUTANEOUS at 19:04

## 2019-03-27 NOTE — PDOC PROGRESS REPORT
Subjective


Progress Note for:: 03/27/19


Subjective:: 





less pain


Reason For Visit: 


LEFT INFECTED WITH GANGRENOUS FOOT








Physical Exam


Vital Signs: 


                                        











Temp Pulse Resp BP Pulse Ox


 


 99.2 F   94   16   101/78   97 


 


 03/26/19 20:00  03/26/19 20:00  03/26/19 20:00  03/26/19 20:00  03/26/19 20:00








                                 Intake & Output











 03/26/19 03/27/19 03/28/19





 06:59 06:59 06:59


 


Intake Total 1916 630 


 


Output Total 473 500 


 


Balance 1443 130 


 


Weight 75 kg 75 kg 











General appearance: PRESENT: no acute distress


Eye exam: PRESENT: EOMI


Mouth exam: PRESENT: moist


Neck exam: PRESENT: full ROM


Respiratory exam: PRESENT: clear to auscultation lorenzo


Cardiovascular exam: PRESENT: RRR


Pulses: PRESENT: normal radial pulses, normal femoral pulses


Vascular exam: PRESENT: normal capillary refill


GI/Abdominal exam: PRESENT: soft


Rectal exam: PRESENT: deferred


Extremities exam: PRESENT: other - left stump examined wound clean with penrose 

drains in place fair capillary refill no evidence of pus with compression of 

forefoot


Psychiatric exam: PRESENT: appropriate affect


Skin exam: PRESENT: dry





Results


Laboratory Results: 


                                        





                                 03/27/19 06:56 





                                 03/27/19 06:56 





                                        











  03/27/19 03/27/19





  06:56 06:56


 


WBC  9.7 


 


RBC  2.41 L 


 


Hgb  8.3 L 


 


Hct  23.7 L 


 


MCV  98 H 


 


MCH  34.3 H 


 


MCHC  34.9 


 


RDW  12.5 


 


Plt Count  435 


 


Seg Neutrophils %  71.8 


 


Lymphocytes %  16.3 


 


Monocytes %  7.8 


 


Eosinophils %  3.3 


 


Basophils %  0.8 


 


Absolute Neutrophils  6.9 


 


Absolute Lymphocytes  1.6 


 


Absolute Monocytes  0.8 


 


Absolute Eosinophils  0.3 


 


Absolute Basophils  0.1 


 


Sodium   136.7 L


 


Potassium   4.3


 


Chloride   107


 


Carbon Dioxide   22


 


Anion Gap   8


 


BUN   18


 


Creatinine   1.48 H


 


Est GFR ( Amer)   > 60


 


Est GFR (Non-Af Amer)   51 L


 


Glucose   113 H


 


Calcium   8.9


 


Phosphorus   5.9 H


 


Magnesium   1.9


 


Total Bilirubin   0.2


 


AST   22


 


ALT   22


 


Alkaline Phosphatase   85


 


Total Protein   6.9


 


Albumin   2.5 L








                                        











  03/14/19 03/14/19





  11:00 11:00


 


Creatine Kinase  28 L 


 


CK-MB (CK-2)   0.34


 


Troponin I   < 0.012











Impressions: 


                                        





Chest X-Ray  03/14/19 11:26


IMPRESSION:  DIFFUSE INTERSTITIAL SCARRING.  NO ACUTE RADIOGRAPHIC FINDING IN 

THE CHEST.


 








Foot X-Ray  03/14/19 11:28


IMPRESSION:  SUPERFICIAL SOFT TISSUE ULCERATION ON THE LATERAL IMAGE.  ON THE AP

AND OBLIQUE IMAGES THERE IS PATCHY OSTEOPENIA AND INDISTINCT APPEARANCE OF THE 

CORTEX AT THE BASE OF THE PROXIMAL PHALANX OF THE 3RD AND 4TH TOE.  SIMILAR 

APPEARANCE INVOLVING THE HEAD OF THE 3RD AND 4TH METATARSALS AND POSSIBLY THE 

2ND.  FINDINGS SUSPICIOUS FOR OSTEOMYELITIS.  FURTHER EVALUATION WITH MRI OF THE

FOOT MAY BE INDICATED.


 














Assessment & Plan





- Diagnosis


(1) Left infected gangrenous foot


Is this a current diagnosis for this admission?: Yes   





- Plan Summary


Plan Summary: 





forefoot appears improved


no obvious purulent drainage


will cont dressing changes


discharge planning


pt lives alone.

## 2019-03-28 LAB
ADD MANUAL DIFF: NO
ALBUMIN SERPL-MCNC: 2.7 G/DL (ref 3.5–5)
ANION GAP SERPL CALC-SCNC: 10 MMOL/L (ref 5–19)
BASOPHILS # BLD AUTO: 0.1 10^3/UL (ref 0–0.2)
BASOPHILS NFR BLD AUTO: 1.1 % (ref 0–2)
BUN SERPL-MCNC: 19 MG/DL (ref 7–20)
CALCIUM: 9.1 MG/DL (ref 8.4–10.2)
CHLORIDE SERPL-SCNC: 105 MMOL/L (ref 98–107)
CO2 SERPL-SCNC: 20 MMOL/L (ref 22–30)
EOSINOPHIL # BLD AUTO: 0.3 10^3/UL (ref 0–0.6)
EOSINOPHIL NFR BLD AUTO: 2.6 % (ref 0–6)
ERYTHROCYTE [DISTWIDTH] IN BLOOD BY AUTOMATED COUNT: 12.5 % (ref 11.5–14)
GLUCOSE SERPL-MCNC: 116 MG/DL (ref 75–110)
HCT VFR BLD CALC: 26.4 % (ref 37.9–51)
HGB BLD-MCNC: 9.2 G/DL (ref 13.5–17)
LYMPHOCYTES # BLD AUTO: 1.9 10^3/UL (ref 0.5–4.7)
LYMPHOCYTES NFR BLD AUTO: 18.5 % (ref 13–45)
MCH RBC QN AUTO: 34.3 PG (ref 27–33.4)
MCHC RBC AUTO-ENTMCNC: 34.7 G/DL (ref 32–36)
MCV RBC AUTO: 99 FL (ref 80–97)
MONOCYTES # BLD AUTO: 0.8 10^3/UL (ref 0.1–1.4)
MONOCYTES NFR BLD AUTO: 7.7 % (ref 3–13)
NEUTROPHILS # BLD AUTO: 7 10^3/UL (ref 1.7–8.2)
NEUTS SEG NFR BLD AUTO: 70.1 % (ref 42–78)
PHOSPHATE SERPL-MCNC: 5.7 MG/DL (ref 2.5–4.5)
PLATELET # BLD: 504 10^3/UL (ref 150–450)
POTASSIUM SERPL-SCNC: 4.9 MMOL/L (ref 3.6–5)
RBC # BLD AUTO: 2.67 10^6/UL (ref 4.35–5.55)
SODIUM SERPL-SCNC: 135.3 MMOL/L (ref 137–145)
TOTAL CELLS COUNTED % (AUTO): 100 %
WBC # BLD AUTO: 10 10^3/UL (ref 4–10.5)

## 2019-03-28 RX ADMIN — INSULIN HUMAN SCH UNIT: 100 INJECTION, SUSPENSION SUBCUTANEOUS at 16:45

## 2019-03-28 RX ADMIN — OXYCODONE AND ACETAMINOPHEN PRN TAB: 5; 325 TABLET ORAL at 22:53

## 2019-03-28 RX ADMIN — NYSTATIN SCH UNIT: 100000 SUSPENSION ORAL at 18:00

## 2019-03-28 RX ADMIN — FAMOTIDINE SCH MG: 20 TABLET, FILM COATED ORAL at 10:36

## 2019-03-28 RX ADMIN — FAMOTIDINE SCH MG: 20 TABLET, FILM COATED ORAL at 22:54

## 2019-03-28 RX ADMIN — DOCUSATE SODIUM SCH MG: 100 CAPSULE, LIQUID FILLED ORAL at 10:36

## 2019-03-28 RX ADMIN — NYSTATIN SCH UNIT: 100000 SUSPENSION ORAL at 14:18

## 2019-03-28 RX ADMIN — OXYCODONE AND ACETAMINOPHEN PRN TAB: 5; 325 TABLET ORAL at 15:51

## 2019-03-28 RX ADMIN — INSULIN LISPRO SCH: 100 INJECTION, SOLUTION INTRAVENOUS; SUBCUTANEOUS at 22:58

## 2019-03-28 RX ADMIN — LEVOFLOXACIN SCH MLS/HR: 750 INJECTION, SOLUTION INTRAVENOUS at 18:00

## 2019-03-28 RX ADMIN — DOCUSATE SODIUM SCH MG: 100 CAPSULE, LIQUID FILLED ORAL at 18:00

## 2019-03-28 RX ADMIN — OXYCODONE AND ACETAMINOPHEN PRN TAB: 5; 325 TABLET ORAL at 05:24

## 2019-03-28 RX ADMIN — NYSTATIN SCH UNIT: 100000 SUSPENSION ORAL at 05:24

## 2019-03-28 RX ADMIN — INSULIN HUMAN SCH UNIT: 100 INJECTION, SUSPENSION SUBCUTANEOUS at 08:26

## 2019-03-28 RX ADMIN — NYSTATIN SCH: 100000 SUSPENSION ORAL at 01:41

## 2019-03-28 RX ADMIN — INSULIN LISPRO SCH: 100 INJECTION, SOLUTION INTRAVENOUS; SUBCUTANEOUS at 12:14

## 2019-03-28 RX ADMIN — INSULIN LISPRO SCH UNIT: 100 INJECTION, SOLUTION INTRAVENOUS; SUBCUTANEOUS at 16:45

## 2019-03-28 RX ADMIN — NYSTATIN SCH UNIT: 100000 SUSPENSION ORAL at 23:00

## 2019-03-28 RX ADMIN — INSULIN LISPRO SCH: 100 INJECTION, SOLUTION INTRAVENOUS; SUBCUTANEOUS at 08:02

## 2019-03-28 RX ADMIN — ENOXAPARIN SODIUM SCH MG: 40 INJECTION SUBCUTANEOUS at 10:36

## 2019-03-28 NOTE — PDOC PROGRESS REPORT
Subjective


Progress Note for:: 03/28/19


Subjective:: 





Patient is seen resting in bed.  He is awake, alert, oriented x3.  Denies chest 

pain, shortness breath or dyspnea.  She denies nausea, vomiting or abdominal 

pain.  He tells me he wants to go home.  We told him that is up to the surgeons.

 He denies any other arthralgias of mild allergies except when his left foot.  

He denies fever chills or night remaining review of systems are negative.


Reason For Visit: 


LEFT INFECTED WITH GANGRENOUS FOOT








Physical Exam


Vital Signs: 


                                        











Temp Pulse Resp BP Pulse Ox


 


 98.2 F   94   16   142/87 H  100 


 


 03/28/19 15:16  03/28/19 15:16  03/28/19 15:16  03/28/19 15:16  03/28/19 15:16








                                 Intake & Output











 03/27/19 03/28/19 03/29/19





 06:59 06:59 06:59


 


Intake Total 


 


Output Total 500 400 


 


Balance 


 


Weight 75 kg 75.3 kg 











General appearance: PRESENT: no acute distress, well-developed, well-nourished


Head exam: PRESENT: atraumatic, normocephalic


Eye exam: PRESENT: conjunctiva pink, EOMI, PERRLA.  ABSENT: scleral icterus


Ear exam: PRESENT: normal external ear exam


Mouth exam: PRESENT: moist, tongue midline


Neck exam: ABSENT: carotid bruit, JVD, lymphadenopathy, thyromegaly


Respiratory exam: PRESENT: clear to auscultation lorenzo.  ABSENT: rales, rhonchi, 

wheezes


Cardiovascular exam: PRESENT: RRR.  ABSENT: diastolic murmur, rubs, systolic 

murmur


Pulses: PRESENT: normal carotid pulses, normal radial pulses


Vascular exam: PRESENT: normal capillary refill


GI/Abdominal exam: PRESENT: normal bowel sounds, soft.  ABSENT: distended, 

guarding, mass, organolmegaly, rebound, tenderness


Rectal exam: PRESENT: deferred


Extremities exam: PRESENT: full ROM, tenderness, other - Left forefoot 

amputation remaining foot and ankles surgical dressing and Ace wrap


Musculoskeletal exam: PRESENT: deformity, full ROM, tenderness - Left forefoot


Neurological exam: PRESENT: alert, awake, oriented to person, oriented to place,

oriented to time, oriented to situation, CN II-XII grossly intact.  ABSENT: 

motor sensory deficit


Psychiatric exam: PRESENT: appropriate affect, normal mood.  ABSENT: homicidal 

ideation, suicidal ideation


Skin exam: PRESENT: dry, warm





Results


Laboratory Results: 


                                        





                                 03/28/19 06:23 





                                 03/28/19 06:23 





                                        











  03/28/19 03/28/19





  06:23 06:23


 


WBC  10.0 


 


RBC  2.67 L 


 


Hgb  9.2 L 


 


Hct  26.4 L 


 


MCV  99 H 


 


MCH  34.3 H 


 


MCHC  34.7 


 


RDW  12.5 


 


Plt Count  504 H 


 


Seg Neutrophils %  70.1 


 


Lymphocytes %  18.5 


 


Monocytes %  7.7 


 


Eosinophils %  2.6 


 


Basophils %  1.1 


 


Absolute Neutrophils  7.0 


 


Absolute Lymphocytes  1.9 


 


Absolute Monocytes  0.8 


 


Absolute Eosinophils  0.3 


 


Absolute Basophils  0.1 


 


Sodium   135.3 L


 


Potassium   4.9


 


Chloride   105


 


Carbon Dioxide   20 L


 


Anion Gap   10


 


BUN   19


 


Creatinine   1.83 H


 


Est GFR ( Amer)   48 L


 


Est GFR (Non-Af Amer)   40 L


 


Glucose   116 H


 


Calcium   9.1


 


Phosphorus   5.7 H


 


Magnesium   1.9


 


Albumin   2.7 L








                                        











  03/14/19 03/14/19





  11:00 11:00


 


Creatine Kinase  28 L 


 


CK-MB (CK-2)   0.34


 


Troponin I   < 0.012











Impressions: 


                                        





Chest X-Ray  03/14/19 11:26


IMPRESSION:  DIFFUSE INTERSTITIAL SCARRING.  NO ACUTE RADIOGRAPHIC FINDING IN 

THE CHEST.


 








Foot X-Ray  03/14/19 11:28


IMPRESSION:  SUPERFICIAL SOFT TISSUE ULCERATION ON THE LATERAL IMAGE.  ON THE AP

AND OBLIQUE IMAGES THERE IS PATCHY OSTEOPENIA AND INDISTINCT APPEARANCE OF THE 

CORTEX AT THE BASE OF THE PROXIMAL PHALANX OF THE 3RD AND 4TH TOE.  SIMILAR 

APPEARANCE INVOLVING THE HEAD OF THE 3RD AND 4TH METATARSALS AND POSSIBLY THE 

2ND.  FINDINGS SUSPICIOUS FOR OSTEOMYELITIS.  FURTHER EVALUATION WITH MRI OF THE

FOOT MAY BE INDICATED.


 














Assessment and Plan





- Diagnosis


(1) Left infected gangrenous foot


Is this a current diagnosis for this admission?: Yes   


Plan: 


The patient was placed on IV antibiotics and seen by surgery.  A left 

transmetatarsal amputation was performed.  Surgery is checking on the wound for 

viability with regard to healing.  Patient will likely return home with home 

health.  He can follow-up in the wound care clinic. Continue Levaquin








(2) Osteomyelitis of left foot


Qualifiers: 


   Osteomyelitis type: other acute   Qualified Code(s): M86.172 - Other acute 

osteomyelitis, left ankle and foot   


Is this a current diagnosis for this admission?: Yes   


Plan: 


With diabetic foot ulcer.  Notes patient has had amputation will likely be able 

to stop antibiotics soon.  His white blood cell count is improving.  Surgery 

will reassess tomorrow.  If the wound is stable we will establish a discharge 

plan with follow-up.








(3) Hypertension


Qualifiers: 


   Hypertension type: essential hypertension   Qualified Code(s): I10 - 

Essential (primary) hypertension   


Is this a current diagnosis for this admission?: Yes   


Plan: 


Blood pressures appear to be well controlled.  Continue current regimen.








(4) Anemia


Is this a current diagnosis for this admission?: Yes   


Plan: 


Multifactorial due to post operative blood loss, fluids resulting in 

hemodilution, probable iron defiency. He had a precipitous drop in hemoglobin 

due to all these factors however his hemoglobin is stable now








(5) Hyponatremia


Is this a current diagnosis for this admission?: Yes   


Plan: 


Stable








(6) Uncontrolled diabetes mellitus


Qualifiers: 


   Diabetes mellitus type: type 2 


Is this a current diagnosis for this admission?: Yes   


Plan: 


On current regimen of NPH and sliding scale the patient has excellent glucose 

control.  Continue current regimen.








(7) Alcoholism /alcohol abuse


Is this a current diagnosis for this admission?: Yes   


Plan: 


History of alcohol abuse.  We will monitor for withdrawal/delirium tremens.








(8) Full code status


Is this a current diagnosis for this admission?: Yes   





- Time


Time Spent with patient: 25-34 minutes


Total Critical Time (Minutes): 20


Medications reviewed and adjusted accordingly: Yes


Anticipated discharge: Home with Homehealth


Within: within 48 hours





- Inpatient Certification


Based on my medical assessment, after consideration of the patient's 

comorbidities, presenting symptoms, or acuity I expect that the services needed 

warrant INPATIENT care.: Yes


I certify that my determination is in accordance with my understanding of 

Medicare's requirements for reasonable and necessary INPATIENT services [42 CFR 

412.3e].: Yes


Medical Necessity: Need for Pain Control, Need for IV Antibiotics, Need for 

Surgery

## 2019-03-28 NOTE — PDOC PROGRESS REPORT
Subjective


Progress Note for:: 03/28/19


Subjective:: 





no complaints of pain





Reason For Visit: 


LEFT INFECTED WITH GANGRENOUS FOOT








Physical Exam


Vital Signs: 


                                        











Temp Pulse Resp BP Pulse Ox


 


 99.2 F   83   18   131/75 H  95 


 


 03/28/19 00:00  03/28/19 00:00  03/28/19 00:00  03/28/19 00:00  03/28/19 00:00








                                 Intake & Output











 03/27/19 03/28/19 03/29/19





 06:59 06:59 06:59


 


Intake Total 630 750 


 


Output Total 500 400 


 


Balance 130 350 


 


Weight 75 kg 75.3 kg 











General appearance: PRESENT: no acute distress


Head exam: PRESENT: normocephalic


Eye exam: PRESENT: EOMI


Mouth exam: PRESENT: moist


Neck exam: PRESENT: full ROM


Respiratory exam: PRESENT: clear to auscultation lorenzo


Cardiovascular exam: PRESENT: RRR


Pulses: PRESENT: normal radial pulses, normal femoral pulses


Vascular exam: PRESENT: other - sl improved capillary refil on plantar surface 

of left foot


Extremities exam: PRESENT: full ROM


Musculoskeletal exam: PRESENT: ambulatory


Psychiatric exam: PRESENT: appropriate affect


Focused psych exam: ABSENT: psychomotor agitation


Skin exam: PRESENT: dry





Results


Laboratory Results: 


                                        





                                 03/28/19 06:23 





                                 03/28/19 06:23 





                                        











  03/28/19 03/28/19





  06:23 06:23


 


WBC  10.0 


 


RBC  2.67 L 


 


Hgb  9.2 L 


 


Hct  26.4 L 


 


MCV  99 H 


 


MCH  34.3 H 


 


MCHC  34.7 


 


RDW  12.5 


 


Plt Count  504 H 


 


Seg Neutrophils %  70.1 


 


Lymphocytes %  18.5 


 


Monocytes %  7.7 


 


Eosinophils %  2.6 


 


Basophils %  1.1 


 


Absolute Neutrophils  7.0 


 


Absolute Lymphocytes  1.9 


 


Absolute Monocytes  0.8 


 


Absolute Eosinophils  0.3 


 


Absolute Basophils  0.1 


 


Sodium   135.3 L


 


Potassium   4.9


 


Chloride   105


 


Carbon Dioxide   20 L


 


Anion Gap   10


 


BUN   19


 


Creatinine   1.83 H


 


Est GFR ( Amer)   48 L


 


Est GFR (Non-Af Amer)   40 L


 


Glucose   116 H


 


Calcium   9.1


 


Phosphorus   5.7 H


 


Magnesium   1.9


 


Albumin   2.7 L








                                        











  03/14/19 03/14/19





  11:00 11:00


 


Creatine Kinase  28 L 


 


CK-MB (CK-2)   0.34


 


Troponin I   < 0.012











Impressions: 


                                        





Chest X-Ray  03/14/19 11:26


IMPRESSION:  DIFFUSE INTERSTITIAL SCARRING.  NO ACUTE RADIOGRAPHIC FINDING IN 

THE CHEST.


 








Foot X-Ray  03/14/19 11:28


IMPRESSION:  SUPERFICIAL SOFT TISSUE ULCERATION ON THE LATERAL IMAGE.  ON THE AP

AND OBLIQUE IMAGES THERE IS PATCHY OSTEOPENIA AND INDISTINCT APPEARANCE OF THE 

CORTEX AT THE BASE OF THE PROXIMAL PHALANX OF THE 3RD AND 4TH TOE.  SIMILAR 

APPEARANCE INVOLVING THE HEAD OF THE 3RD AND 4TH METATARSALS AND POSSIBLY THE 

2ND.  FINDINGS SUSPICIOUS FOR OSTEOMYELITIS.  FURTHER EVALUATION WITH MRI OF THE

FOOT MAY BE INDICATED.


 














Assessment & Plan





- Diagnosis


(1) Left infected gangrenous foot


Is this a current diagnosis for this admission?: Yes   





- Plan Summary


Plan Summary: 





afeb vss


wbc wnl


left foot examined


min if any  drainage from penrose


even with vigorous compression, I could 


not express any pus


started to advance penrose drain


will cont iv abx for now


physical therapy.


occupational  rx referral.

## 2019-03-28 NOTE — PDOC PROGRESS REPORT
Subjective


Progress Note for:: 03/28/19


Subjective:: 





The patient is having some pain in his foot from his transmetatarsal amputation.

He denies fever or chills. No chest pain or cough. No abdominal pain. No nausea 

or vomiting. No voiding complaints.


Reason For Visit: 


LEFT INFECTED WITH GANGRENOUS FOOT








Physical Exam


Vital Signs: 


                                        











Temp Pulse Resp BP Pulse Ox


 


 99.2 F   83   18   131/75 H  95 


 


 03/28/19 00:00  03/28/19 00:00  03/28/19 00:00  03/28/19 00:00  03/28/19 00:00








                                 Intake & Output











 03/27/19 03/28/19 03/29/19





 06:59 06:59 06:59


 


Intake Total 630 750 


 


Output Total 500 400 


 


Balance 130 350 


 


Weight 75 kg 75.3 kg 











General appearance: PRESENT: no acute distress, well-developed, well-nourished


Head exam: PRESENT: atraumatic, normocephalic


Respiratory exam: PRESENT: clear to auscultation lorenzo.  ABSENT: rales, rhonchi, 

wheezes


Cardiovascular exam: PRESENT: RRR.  ABSENT: diastolic murmur, rubs, systolic 

murmur


GI/Abdominal exam: PRESENT: normal bowel sounds, soft.  ABSENT: distended, 

guarding, mass, organolmegaly, rebound, tenderness


Rectal exam: PRESENT: deferred


Extremities exam: PRESENT: other - Surgical dressing in place to left foot- not 

removed.  ABSENT: calf tenderness, clubbing, pedal edema


Neurological exam: PRESENT: alert, awake, oriented to person, oriented to place,

oriented to time, oriented to situation, CN II-XII grossly intact.  ABSENT: 

motor sensory deficit


Psychiatric exam: PRESENT: appropriate affect, normal mood.  ABSENT: homicidal 

ideation, suicidal ideation


Skin exam: PRESENT: dry, intact, warm.  ABSENT: cyanosis, rash





Results


Laboratory Results: 


                                        





                                 03/28/19 06:23 





                                 03/28/19 06:23 





                                        











  03/28/19 03/28/19





  06:23 06:23


 


WBC  10.0 


 


RBC  2.67 L 


 


Hgb  9.2 L 


 


Hct  26.4 L 


 


MCV  99 H 


 


MCH  34.3 H 


 


MCHC  34.7 


 


RDW  12.5 


 


Plt Count  504 H 


 


Seg Neutrophils %  70.1 


 


Lymphocytes %  18.5 


 


Monocytes %  7.7 


 


Eosinophils %  2.6 


 


Basophils %  1.1 


 


Absolute Neutrophils  7.0 


 


Absolute Lymphocytes  1.9 


 


Absolute Monocytes  0.8 


 


Absolute Eosinophils  0.3 


 


Absolute Basophils  0.1 


 


Sodium   135.3 L


 


Potassium   4.9


 


Chloride   105


 


Carbon Dioxide   20 L


 


Anion Gap   10


 


BUN   19


 


Creatinine   1.83 H


 


Est GFR ( Amer)   48 L


 


Est GFR (Non-Af Amer)   40 L


 


Glucose   116 H


 


Calcium   9.1


 


Phosphorus   5.7 H


 


Magnesium   1.9


 


Albumin   2.7 L








                                        











  03/14/19 03/14/19





  11:00 11:00


 


Creatine Kinase  28 L 


 


CK-MB (CK-2)   0.34


 


Troponin I   < 0.012











Impressions: 


                                        





Chest X-Ray  03/14/19 11:26


IMPRESSION:  DIFFUSE INTERSTITIAL SCARRING.  NO ACUTE RADIOGRAPHIC FINDING IN 

THE CHEST.


 








Foot X-Ray  03/14/19 11:28


IMPRESSION:  SUPERFICIAL SOFT TISSUE ULCERATION ON THE LATERAL IMAGE.  ON THE AP

AND OBLIQUE IMAGES THERE IS PATCHY OSTEOPENIA AND INDISTINCT APPEARANCE OF THE 

CORTEX AT THE BASE OF THE PROXIMAL PHALANX OF THE 3RD AND 4TH TOE.  SIMILAR 

APPEARANCE INVOLVING THE HEAD OF THE 3RD AND 4TH METATARSALS AND POSSIBLY THE 

2ND.  FINDINGS SUSPICIOUS FOR OSTEOMYELITIS.  FURTHER EVALUATION WITH MRI OF THE

FOOT MAY BE INDICATED.


 














Assessment and Plan





- Diagnosis


(1) Left infected gangrenous foot


Is this a current diagnosis for this admission?: Yes   


Plan: 


The patient was placed on IV antibiotics and seen by surgery.  A left 

transmetatarsal amputation was performed.  Surgery is checking on the wound for 

viability with regard to healing.  Patient will likely return home with home 

health.  He can follow-up in the wound care clinic. Continue Levaquin








(2) Osteomyelitis of left foot


Qualifiers: 


   Osteomyelitis type: other acute   Qualified Code(s): M86.172 - Other acute os

teomyelitis, left ankle and foot   


Is this a current diagnosis for this admission?: Yes   


Plan: 


With diabetic foot ulcer.  Notes patient has had amputation will likely be able 

to stop antibiotics soon.  His white blood cell count is improving.  Surgery 

will reassess tomorrow.  If the wound is stable we will establish a discharge 

plan with follow-up.








(3) Anemia


Is this a current diagnosis for this admission?: Yes   


Plan: 


Multifactorial due to post operative blood loss, fluids resulting in 

hemodilution, probable iron defiency. He had a precipitous drop in hemoglobin 

due to all these factors however his hemoglobin is stable now








(4) Hyponatremia


Is this a current diagnosis for this admission?: Yes   


Plan: 


Stable








(5) Alcoholism /alcohol abuse


Is this a current diagnosis for this admission?: Yes   


Plan: 


History of alcohol abuse.  We will monitor for withdrawal/delirium tremens.








(6) Hypertension


Qualifiers: 


   Hypertension type: essential hypertension   Qualified Code(s): I10 - 

Essential (primary) hypertension   


Is this a current diagnosis for this admission?: Yes   


Plan: 


Blood pressures appear to be well controlled.  Continue current regimen.








(7) Full code status


Is this a current diagnosis for this admission?: Yes   





- Time


Time Spent with patient: 35 or more minutes





- Inpatient Certification


Post Hospital Care: Other - Need to talk to discharge planning regarding 

services at home. hopefully can be discharge in the next 24-48 hours

## 2019-03-29 LAB
ANION GAP SERPL CALC-SCNC: 9 MMOL/L (ref 5–19)
BUN SERPL-MCNC: 19 MG/DL (ref 7–20)
CALCIUM: 9 MG/DL (ref 8.4–10.2)
CHLORIDE SERPL-SCNC: 107 MMOL/L (ref 98–107)
CO2 SERPL-SCNC: 22 MMOL/L (ref 22–30)
GLUCOSE SERPL-MCNC: 101 MG/DL (ref 75–110)
POTASSIUM SERPL-SCNC: 5 MMOL/L (ref 3.6–5)
SODIUM SERPL-SCNC: 137.5 MMOL/L (ref 137–145)

## 2019-03-29 RX ADMIN — FAMOTIDINE SCH MG: 20 TABLET, FILM COATED ORAL at 09:58

## 2019-03-29 RX ADMIN — DOCUSATE SODIUM SCH MG: 100 CAPSULE, LIQUID FILLED ORAL at 09:58

## 2019-03-29 RX ADMIN — DOCUSATE SODIUM SCH MG: 100 CAPSULE, LIQUID FILLED ORAL at 17:57

## 2019-03-29 RX ADMIN — NYSTATIN SCH UNIT: 100000 SUSPENSION ORAL at 23:06

## 2019-03-29 RX ADMIN — NYSTATIN SCH UNIT: 100000 SUSPENSION ORAL at 14:49

## 2019-03-29 RX ADMIN — FAMOTIDINE SCH MG: 20 TABLET, FILM COATED ORAL at 23:06

## 2019-03-29 RX ADMIN — OXYCODONE AND ACETAMINOPHEN PRN TAB: 5; 325 TABLET ORAL at 19:53

## 2019-03-29 RX ADMIN — NYSTATIN SCH UNIT: 100000 SUSPENSION ORAL at 06:39

## 2019-03-29 RX ADMIN — INSULIN LISPRO SCH: 100 INJECTION, SOLUTION INTRAVENOUS; SUBCUTANEOUS at 16:18

## 2019-03-29 RX ADMIN — INSULIN HUMAN SCH UNIT: 100 INJECTION, SUSPENSION SUBCUTANEOUS at 16:40

## 2019-03-29 RX ADMIN — INSULIN LISPRO SCH: 100 INJECTION, SOLUTION INTRAVENOUS; SUBCUTANEOUS at 07:31

## 2019-03-29 RX ADMIN — ENOXAPARIN SODIUM SCH MG: 40 INJECTION SUBCUTANEOUS at 09:59

## 2019-03-29 RX ADMIN — INSULIN LISPRO SCH: 100 INJECTION, SOLUTION INTRAVENOUS; SUBCUTANEOUS at 22:41

## 2019-03-29 RX ADMIN — INSULIN HUMAN SCH UNIT: 100 INJECTION, SUSPENSION SUBCUTANEOUS at 07:35

## 2019-03-29 RX ADMIN — NYSTATIN SCH: 100000 SUSPENSION ORAL at 17:48

## 2019-03-29 RX ADMIN — LEVOFLOXACIN SCH MLS/HR: 750 INJECTION, SOLUTION INTRAVENOUS at 17:59

## 2019-03-29 RX ADMIN — OXYCODONE AND ACETAMINOPHEN PRN TAB: 5; 325 TABLET ORAL at 09:59

## 2019-03-29 RX ADMIN — INSULIN LISPRO SCH: 100 INJECTION, SOLUTION INTRAVENOUS; SUBCUTANEOUS at 11:23

## 2019-03-29 NOTE — PDOC PROGRESS REPORT
Subjective


Progress Note for:: 03/29/19


Subjective:: 





Patient is seen resting in bed.  He is awake, alert, oriented x3.  Denies chest 

pain, shortness breath or dyspnea.  He denies nausea, vomiting or abdominal 

pain.  He  continues to tell me he wants to go home.  We told him that is up to 

the surgeons.  He denies any other arthralgias of mild allergies except when his

left foot.  He denies fever chills or night remaining review of systems are 

negative.


Reason For Visit: 


LEFT INFECTED WITH GANGRENOUS FOOT








Physical Exam


Vital Signs: 


                                        











Temp Pulse Resp BP Pulse Ox


 


 98.3 F   86   18   121/73   100 


 


 03/29/19 11:54  03/29/19 11:54  03/29/19 11:54  03/29/19 11:54  03/29/19 11:54








                                 Intake & Output











 03/28/19 03/29/19 03/30/19





 06:59 06:59 06:59


 


Intake Total 750 1786 


 


Output Total 400  


 


Balance 350 1786 


 


Weight 75.3 kg 75 kg 75 kg











General appearance: PRESENT: no acute distress, well-developed, well-nourished


Head exam: PRESENT: atraumatic, normocephalic


Eye exam: PRESENT: conjunctiva pink, EOMI, PERRLA.  ABSENT: scleral icterus


Ear exam: PRESENT: normal external ear exam


Mouth exam: PRESENT: moist, tongue midline


Neck exam: ABSENT: carotid bruit, JVD, lymphadenopathy, thyromegaly


Respiratory exam: PRESENT: clear to auscultation lorenzo.  ABSENT: rales, rhonchi, 

wheezes


Cardiovascular exam: PRESENT: RRR.  ABSENT: diastolic murmur, rubs, systolic 

murmur


Pulses: PRESENT: normal carotid pulses, normal radial pulses


Vascular exam: PRESENT: normal capillary refill


GI/Abdominal exam: PRESENT: normal bowel sounds, soft.  ABSENT: distended, 

guarding, mass, organolmegaly, rebound, tenderness


Rectal exam: PRESENT: deferred


Extremities exam: PRESENT: full ROM, tenderness - left forefoot


Musculoskeletal exam: PRESENT: ambulatory


Neurological exam: PRESENT: alert, awake, oriented to person, oriented to place,

oriented to time, oriented to situation, CN II-XII grossly intact.  ABSENT: 

motor sensory deficit


Psychiatric exam: PRESENT: anxious


Skin exam: PRESENT: dry, intact, warm.  ABSENT: cyanosis, rash





Results


Laboratory Results: 


                                        





                                 03/28/19 06:23 





                                 03/29/19 05:55 





                                        











  03/29/19





  05:55


 


Sodium  137.5


 


Potassium  5.0


 


Chloride  107


 


Carbon Dioxide  22


 


Anion Gap  9


 


BUN  19


 


Creatinine  1.53 H


 


Est GFR ( Amer)  59 L


 


Est GFR (Non-Af Amer)  49 L


 


Glucose  101


 


Calcium  9.0


 


Magnesium  1.9








                                        











  03/14/19 03/14/19





  11:00 11:00


 


Creatine Kinase  28 L 


 


CK-MB (CK-2)   0.34


 


Troponin I   < 0.012











Impressions: 


                                        





Chest X-Ray  03/14/19 11:26


IMPRESSION:  DIFFUSE INTERSTITIAL SCARRING.  NO ACUTE RADIOGRAPHIC FINDING IN 

THE CHEST.


 








Foot X-Ray  03/14/19 11:28


IMPRESSION:  SUPERFICIAL SOFT TISSUE ULCERATION ON THE LATERAL IMAGE.  ON THE AP

AND OBLIQUE IMAGES THERE IS PATCHY OSTEOPENIA AND INDISTINCT APPEARANCE OF THE 

CORTEX AT THE BASE OF THE PROXIMAL PHALANX OF THE 3RD AND 4TH TOE.  SIMILAR 

APPEARANCE INVOLVING THE HEAD OF THE 3RD AND 4TH METATARSALS AND POSSIBLY THE 

2ND.  FINDINGS SUSPICIOUS FOR OSTEOMYELITIS.  FURTHER EVALUATION WITH MRI OF THE

FOOT MAY BE INDICATED.


 














Assessment and Plan





- Diagnosis


(1) Left infected gangrenous foot


Is this a current diagnosis for this admission?: Yes   


Plan: 


The patient was placed on IV antibiotics and seen by surgery.  A left 

transmetatarsal amputation was performed.  Surgery is checking on the wound for 

viability with regard to healing.   Patient will likely return home with home 

health.  He can follow-up in the wound care clinic. Continue IV Levaquin








(2) Osteomyelitis of left foot


Qualifiers: 


   Osteomyelitis type: other acute   Qualified Code(s): M86.172 - Other acute 

osteomyelitis, left ankle and foot   


Is this a current diagnosis for this admission?: Yes   


Plan: 


With diabetic foot ulcer post left forefoot amputation, which is not healing 

well at the present time  His white blood cell count is improving.  Surgery will

reassess tomorrow.  If the wound is stable we will establish a discharge plan 

with follow-up.








(3) Hypertension


Qualifiers: 


   Hypertension type: essential hypertension   Qualified Code(s): I10 - 

Essential (primary) hypertension   


Is this a current diagnosis for this admission?: Yes   


Plan: 


Blood pressures appear to be well controlled.  Continue current regimen.








(4) Anemia


Is this a current diagnosis for this admission?: Yes   


Plan: 


Multifactorial due to post operative blood loss, fluids resulting in 

hemodilution, probable iron defiency. He had a precipitous drop in hemoglobin 

due to all these factors however his hemoglobin is stable now








(5) Hyponatremia


Is this a current diagnosis for this admission?: Yes   


Plan: 


Stable








(6) Uncontrolled diabetes mellitus


Qualifiers: 


   Diabetes mellitus type: type 2 


Is this a current diagnosis for this admission?: Yes   


Plan: 


On current regimen of NPH and sliding scale the patient has excellent glucose 

control.  Continue current regimen.








(7) Alcoholism /alcohol abuse


Is this a current diagnosis for this admission?: Yes   


Plan: 


History of alcohol abuse.  We will monitor for withdrawal/delirium tremens.








(8) Full code status


Is this a current diagnosis for this admission?: Yes   





- Time


Time Spent with patient: 25-34 minutes


Total Critical Time (Minutes): 20


Medications reviewed and adjusted accordingly: Yes





- Inpatient Certification


Based on my medical assessment, after consideration of the patient's 

comorbidities, presenting symptoms, or acuity I expect that the services needed 

warrant INPATIENT care.: Yes


I certify that my determination is in accordance with my understanding of 

Medicare's requirements for reasonable and necessary INPATIENT services [42 CFR 

412.3e].: Yes


Medical Necessity: Need for IV Antibiotics, Need for Surgery

## 2019-03-30 RX ADMIN — ENOXAPARIN SODIUM SCH MG: 40 INJECTION SUBCUTANEOUS at 10:09

## 2019-03-30 RX ADMIN — FAMOTIDINE SCH MG: 20 TABLET, FILM COATED ORAL at 10:09

## 2019-03-30 RX ADMIN — NYSTATIN SCH UNIT: 100000 SUSPENSION ORAL at 17:31

## 2019-03-30 RX ADMIN — INSULIN HUMAN SCH: 100 INJECTION, SUSPENSION SUBCUTANEOUS at 16:40

## 2019-03-30 RX ADMIN — INSULIN HUMAN SCH UNIT: 100 INJECTION, SUSPENSION SUBCUTANEOUS at 10:09

## 2019-03-30 RX ADMIN — OXYCODONE AND ACETAMINOPHEN PRN TAB: 5; 325 TABLET ORAL at 21:10

## 2019-03-30 RX ADMIN — OXYCODONE AND ACETAMINOPHEN PRN TAB: 5; 325 TABLET ORAL at 15:10

## 2019-03-30 RX ADMIN — DOCUSATE SODIUM SCH MG: 100 CAPSULE, LIQUID FILLED ORAL at 10:09

## 2019-03-30 RX ADMIN — FAMOTIDINE SCH MG: 20 TABLET, FILM COATED ORAL at 21:10

## 2019-03-30 RX ADMIN — INSULIN LISPRO SCH: 100 INJECTION, SOLUTION INTRAVENOUS; SUBCUTANEOUS at 21:10

## 2019-03-30 RX ADMIN — INSULIN LISPRO SCH: 100 INJECTION, SOLUTION INTRAVENOUS; SUBCUTANEOUS at 13:19

## 2019-03-30 RX ADMIN — INSULIN LISPRO SCH: 100 INJECTION, SOLUTION INTRAVENOUS; SUBCUTANEOUS at 16:40

## 2019-03-30 RX ADMIN — DOCUSATE SODIUM SCH MG: 100 CAPSULE, LIQUID FILLED ORAL at 17:31

## 2019-03-30 RX ADMIN — INSULIN LISPRO SCH: 100 INJECTION, SOLUTION INTRAVENOUS; SUBCUTANEOUS at 10:02

## 2019-03-30 RX ADMIN — OXYCODONE AND ACETAMINOPHEN PRN TAB: 5; 325 TABLET ORAL at 08:32

## 2019-03-30 RX ADMIN — NYSTATIN SCH UNIT: 100000 SUSPENSION ORAL at 06:13

## 2019-03-30 RX ADMIN — NYSTATIN SCH UNIT: 100000 SUSPENSION ORAL at 13:57

## 2019-03-30 RX ADMIN — LEVOFLOXACIN SCH MLS/HR: 750 INJECTION, SOLUTION INTRAVENOUS at 17:31

## 2019-03-30 NOTE — PDOC PROGRESS REPORT
Subjective


Progress Note for:: 03/30/19


Subjective:: 





Patient is seen resting in bed.  He is awake, alert, oriented x3.  Denies chest 

pain, shortness breath or dyspnea.  He denies nausea, vomiting or abdominal 

pain.  Pain in his left foot is improved.  He denies any other arthralgias of 

mild allergies except when his left foot.  He denies fever chills or night 

remaining review of systems are negative.


Reason For Visit: 


LEFT INFECTED WITH GANGRENOUS FOOT








Physical Exam


Vital Signs: 


                                        











Temp Pulse Resp BP Pulse Ox


 


 97.4 F   90   18   101/60   99 


 


 03/30/19 07:22  03/30/19 07:22  03/30/19 07:22  03/30/19 07:22  03/30/19 07:22








                                 Intake & Output











 03/29/19 03/30/19 03/31/19





 06:59 06:59 06:59


 


Intake Total 1786 1053 


 


Output Total  475 


 


Balance 1786 578 


 


Weight 75 kg 76.1 kg 











General appearance: PRESENT: no acute distress, well-developed, well-nourished


Head exam: PRESENT: atraumatic, normocephalic


Eye exam: PRESENT: conjunctiva pink, EOMI, PERRLA.  ABSENT: scleral icterus


Ear exam: PRESENT: normal external ear exam


Mouth exam: PRESENT: moist, tongue midline


Teeth exam: PRESENT: poor dentation


Neck exam: ABSENT: carotid bruit, JVD, lymphadenopathy, thyromegaly


Respiratory exam: PRESENT: clear to auscultation lorenzo.  ABSENT: rales, rhonchi, 

wheezes


Cardiovascular exam: PRESENT: RRR.  ABSENT: diastolic murmur, rubs, systolic 

murmur


Pulses: PRESENT: normal carotid pulses, normal radial pulses


Vascular exam: PRESENT: normal capillary refill


GI/Abdominal exam: PRESENT: normal bowel sounds, soft.  ABSENT: distended, 

guarding, mass, organolmegaly, rebound, tenderness


Rectal exam: PRESENT: deferred


Extremities exam: PRESENT: full ROM, tenderness


Musculoskeletal exam: PRESENT: ambulatory, tenderness - Left forefoot incision


Neurological exam: PRESENT: alert, awake, oriented to person, oriented to place,

oriented to time, oriented to situation, CN II-XII grossly intact.  ABSENT: 

motor sensory deficit


Psychiatric exam: PRESENT: appropriate affect, normal mood.  ABSENT: homicidal 

ideation, suicidal ideation


Skin exam: PRESENT: dry, intact, warm.  ABSENT: cyanosis, rash





Results


Laboratory Results: 


                                        





                                 03/28/19 06:23 





                                 03/29/19 05:55 





                                        











  03/14/19 03/14/19





  11:00 11:00


 


Creatine Kinase  28 L 


 


CK-MB (CK-2)   0.34


 


Troponin I   < 0.012











Impressions: 


                                        





Chest X-Ray  03/14/19 11:26


IMPRESSION:  DIFFUSE INTERSTITIAL SCARRING.  NO ACUTE RADIOGRAPHIC FINDING IN 

THE CHEST.


 








Foot X-Ray  03/14/19 11:28


IMPRESSION:  SUPERFICIAL SOFT TISSUE ULCERATION ON THE LATERAL IMAGE.  ON THE AP

AND OBLIQUE IMAGES THERE IS PATCHY OSTEOPENIA AND INDISTINCT APPEARANCE OF THE 

CORTEX AT THE BASE OF THE PROXIMAL PHALANX OF THE 3RD AND 4TH TOE.  SIMILAR 

APPEARANCE INVOLVING THE HEAD OF THE 3RD AND 4TH METATARSALS AND POSSIBLY THE 

2ND.  FINDINGS SUSPICIOUS FOR OSTEOMYELITIS.  FURTHER EVALUATION WITH MRI OF THE

FOOT MAY BE INDICATED.


 














Assessment and Plan





- Diagnosis


(1) Left infected gangrenous foot


Is this a current diagnosis for this admission?: Yes   


Plan: 


The patient was placed on IV antibiotics and seen by surgery.  A left 

transmetatarsal amputation was performed.  Surgeon today feels the wound looks 

better.  He would like to continue IV antibiotics over the weekend and possible 

discharge Monday or Tuesday.  He will new home health nursing and follow-up in 

the wound care clinic








(2) Osteomyelitis of left foot


Qualifiers: 


   Osteomyelitis type: other acute   Qualified Code(s): M86.172 - Other acute 

osteomyelitis, left ankle and foot   


Is this a current diagnosis for this admission?: Yes   


Plan: 


With diabetic foot ulcer post left forefoot amputation.  Surgery is happier 

today with the wound.  Plan is above in #1








(3) Hypertension


Qualifiers: 


   Hypertension type: essential hypertension   Qualified Code(s): I10 - 

Essential (primary) hypertension   


Is this a current diagnosis for this admission?: Yes   


Plan: 


Blood pressures appear to be well controlled.  Continue current regimen.








(4) Anemia


Is this a current diagnosis for this admission?: Yes   


Plan: 


Multifactorial due to post operative blood loss, fluids resulting in 

hemodilution, probable iron defiency. He had a precipitous drop in hemoglobin 

due to all these factors however his hemoglobin is stable now








(5) Hyponatremia


Is this a current diagnosis for this admission?: Yes   


Plan: 


Stable








(6) Uncontrolled diabetes mellitus


Qualifiers: 


   Diabetes mellitus type: type 2 


Is this a current diagnosis for this admission?: Yes   


Plan: 


On current regimen of NPH and sliding scale the patient has excellent glucose 

control.  Continue current regimen.








(7) Alcoholism /alcohol abuse


Is this a current diagnosis for this admission?: Yes   


Plan: 


History of alcohol abuse.  We will monitor for withdrawal/delirium tremens.








(8) Full code status


Is this a current diagnosis for this admission?: Yes   





- Time


Time Spent with patient: 25-34 minutes


Total Critical Time (Minutes): 25


Medications reviewed and adjusted accordingly: Yes


Anticipated discharge: Home with Homehealth


Within: within 48 hours

## 2019-03-30 NOTE — PDOC PROGRESS REPORT
Subjective


Progress Note for:: 03/30/19


Subjective:: 





feels well, no pain


Reason For Visit: 


LEFT INFECTED WITH GANGRENOUS FOOT








Physical Exam


Vital Signs: 


                                        











Temp Pulse Resp BP Pulse Ox


 


 98.4 F   88   16   105/67   97 


 


 03/30/19 00:00  03/30/19 00:00  03/30/19 00:00  03/30/19 00:00  03/30/19 00:00








                                 Intake & Output











 03/29/19 03/30/19 03/31/19





 06:59 06:59 06:59


 


Intake Total 1786 1053 


 


Output Total  475 


 


Balance 1786 578 


 


Weight 75 kg 76.1 kg 











General appearance: PRESENT: no acute distress


Head exam: PRESENT: normocephalic


Eye exam: PRESENT: EOMI


Mouth exam: PRESENT: moist


Neck exam: PRESENT: full ROM


Respiratory exam: PRESENT: clear to auscultation lorenzo


Cardiovascular exam: PRESENT: RRR


Pulses: PRESENT: +2 pedal pulses bilateral


GI/Abdominal exam: PRESENT: soft


Rectal exam: PRESENT: deferred


Extremities exam: PRESENT: other - left amp site pink min drainage, packed with 

iodoform no evidence of purulence


Neurological exam: PRESENT: alert, awake, oriented to person, oriented to place,

oriented to time, oriented to situation





Results


Laboratory Results: 


                                        





                                 03/28/19 06:23 





                                 03/29/19 05:55 





                                        











  03/14/19 03/14/19





  11:00 11:00


 


Creatine Kinase  28 L 


 


CK-MB (CK-2)   0.34


 


Troponin I   < 0.012











Impressions: 


                                        





Chest X-Ray  03/14/19 11:26


IMPRESSION:  DIFFUSE INTERSTITIAL SCARRING.  NO ACUTE RADIOGRAPHIC FINDING IN 

THE CHEST.


 








Foot X-Ray  03/14/19 11:28


IMPRESSION:  SUPERFICIAL SOFT TISSUE ULCERATION ON THE LATERAL IMAGE.  ON THE AP

AND OBLIQUE IMAGES THERE IS PATCHY OSTEOPENIA AND INDISTINCT APPEARANCE OF THE 

CORTEX AT THE BASE OF THE PROXIMAL PHALANX OF THE 3RD AND 4TH TOE.  SIMILAR 

APPEARANCE INVOLVING THE HEAD OF THE 3RD AND 4TH METATARSALS AND POSSIBLY THE 

2ND.  FINDINGS SUSPICIOUS FOR OSTEOMYELITIS.  FURTHER EVALUATION WITH MRI OF THE

FOOT MAY BE INDICATED.


 














Assessment & Plan





- Diagnosis


(1) Left infected gangrenous foot


Is this a current diagnosis for this admission?: Yes   





- Plan Summary


Plan Summary: 





left transmet stump


seems to be healing well


2 areas of packing with iodoform gauze


will cont with packing through the weekend and


cont iv abx


poss home on mon or tues


pt live by himself, cannot do proper wound care.

## 2019-03-31 RX ADMIN — DOCUSATE SODIUM SCH MG: 100 CAPSULE, LIQUID FILLED ORAL at 17:38

## 2019-03-31 RX ADMIN — INSULIN LISPRO SCH: 100 INJECTION, SOLUTION INTRAVENOUS; SUBCUTANEOUS at 22:00

## 2019-03-31 RX ADMIN — INSULIN LISPRO SCH: 100 INJECTION, SOLUTION INTRAVENOUS; SUBCUTANEOUS at 12:09

## 2019-03-31 RX ADMIN — NYSTATIN SCH UNIT: 100000 SUSPENSION ORAL at 05:44

## 2019-03-31 RX ADMIN — OXYCODONE AND ACETAMINOPHEN PRN TAB: 5; 325 TABLET ORAL at 17:41

## 2019-03-31 RX ADMIN — INSULIN LISPRO SCH: 100 INJECTION, SOLUTION INTRAVENOUS; SUBCUTANEOUS at 16:23

## 2019-03-31 RX ADMIN — NYSTATIN SCH UNIT: 100000 SUSPENSION ORAL at 12:49

## 2019-03-31 RX ADMIN — LEVOFLOXACIN SCH MLS/HR: 750 INJECTION, SOLUTION INTRAVENOUS at 17:38

## 2019-03-31 RX ADMIN — DOCUSATE SODIUM SCH MG: 100 CAPSULE, LIQUID FILLED ORAL at 10:21

## 2019-03-31 RX ADMIN — INSULIN HUMAN SCH: 100 INJECTION, SUSPENSION SUBCUTANEOUS at 16:24

## 2019-03-31 RX ADMIN — FAMOTIDINE SCH: 20 TABLET, FILM COATED ORAL at 23:19

## 2019-03-31 RX ADMIN — NYSTATIN SCH UNIT: 100000 SUSPENSION ORAL at 17:38

## 2019-03-31 RX ADMIN — OXYCODONE AND ACETAMINOPHEN PRN TAB: 5; 325 TABLET ORAL at 05:45

## 2019-03-31 RX ADMIN — ENOXAPARIN SODIUM SCH MG: 40 INJECTION SUBCUTANEOUS at 10:22

## 2019-03-31 RX ADMIN — INSULIN LISPRO SCH: 100 INJECTION, SOLUTION INTRAVENOUS; SUBCUTANEOUS at 08:31

## 2019-03-31 RX ADMIN — NYSTATIN SCH UNIT: 100000 SUSPENSION ORAL at 00:01

## 2019-03-31 RX ADMIN — FAMOTIDINE SCH MG: 20 TABLET, FILM COATED ORAL at 10:21

## 2019-03-31 RX ADMIN — INSULIN HUMAN SCH UNIT: 100 INJECTION, SUSPENSION SUBCUTANEOUS at 10:21

## 2019-03-31 NOTE — PDOC PROGRESS REPORT
Subjective


Progress Note for:: 03/31/19


Subjective:: 


BRIANDA SNOW is a 48 year old male male patient with past medical history 

of type 2 diabetes mellitus, hypertension, alcoholism presents to ED for left 

foot pain and ulceration now s/p midfoot amputation.





Patient was seen on afternoon rounds.  He was ambulating in his room.  He 

reports that his pain is well controlled at this time; he is looking forward to 

possible discharge tomorrow.  He tells me that he intends to return to work 

right away and will be capable of doing his dressing changes on his own.  I am 

concerned that he may not be able to visualize his foot for any required wound 

packing; he states that he does not have a family member or friend available to 

assist.  We will need to ask discharge planning/social work to assist with 

making wound care arrangements.


Otherwise, he states that he is feeling well and has no new complaints.  He 

denies fever, chills, chest pain, palpitations, dyspnea, cough, abdominal pain, 

nausea vomiting diarrhea and constipation.


No concerns per nursing.


Reason For Visit: 


LEFT INFECTED WITH GANGRENOUS FOOT








Physical Exam


Vital Signs: 


                                        











Temp Pulse Resp BP Pulse Ox


 


 97.4 F   89   18   100/57 L  100 


 


 03/31/19 11:50  03/31/19 11:50  03/31/19 11:50  03/31/19 11:50  03/31/19 11:50








                                 Intake & Output











 03/30/19 03/31/19 04/01/19





 06:59 06:59 06:59


 


Intake Total 1053 1275 


 


Output Total 475 550 


 


Balance 578 725 


 


Weight 76.1 kg 77.8 kg 











General appearance: PRESENT: no acute distress, well-developed, well-nourished


Head exam: PRESENT: atraumatic, normocephalic


Eye exam: PRESENT: conjunctiva pink, EOMI, PERRLA.  ABSENT: scleral icterus


Mouth exam: PRESENT: moist, tongue midline


Teeth exam: PRESENT: poor dentation


Respiratory exam: PRESENT: clear to auscultation lorenzo, symmetrical, unlabored.  

ABSENT: rales, rhonchi, wheezes


Cardiovascular exam: PRESENT: RRR, +S1, +S2.  ABSENT: diastolic murmur, rubs, 

systolic murmur


Pulses: PRESENT: normal dorsalis pedis pul


Vascular exam: PRESENT: normal capillary refill


GI/Abdominal exam: PRESENT: normal bowel sounds, soft.  ABSENT: distended, 

guarding, mass, organolmegaly, rebound, tenderness


Rectal exam: PRESENT: deferred


Extremities exam: PRESENT: full ROM, other - Left midfoot amputation.  ABSENT: 

calf tenderness, clubbing, pedal edema


Musculoskeletal exam: PRESENT: ambulatory


Neurological exam: PRESENT: alert, awake, oriented to person, oriented to place,

oriented to time, oriented to situation, CN II-XII grossly intact.  ABSENT: 

motor sensory deficit


Psychiatric exam: PRESENT: appropriate affect, normal mood.  ABSENT: homicidal 

ideation, suicidal ideation


Skin exam: PRESENT: dry, warm, other - Surgical site not visualized; clean dry 

dressing in place..  ABSENT: cyanosis, rash





Results


Laboratory Results: 


                                        





                                 03/28/19 06:23 





                                 03/29/19 05:55 





                                        











  03/14/19 03/14/19





  11:00 11:00


 


Creatine Kinase  28 L 


 


CK-MB (CK-2)   0.34


 


Troponin I   < 0.012











Impressions: 


                                        





Chest X-Ray  03/14/19 11:26


IMPRESSION:  DIFFUSE INTERSTITIAL SCARRING.  NO ACUTE RADIOGRAPHIC FINDING IN 

THE CHEST.


 








Foot X-Ray  03/14/19 11:28


IMPRESSION:  SUPERFICIAL SOFT TISSUE ULCERATION ON THE LATERAL IMAGE.  ON THE AP

AND OBLIQUE IMAGES THERE IS PATCHY OSTEOPENIA AND INDISTINCT APPEARANCE OF THE 

CORTEX AT THE BASE OF THE PROXIMAL PHALANX OF THE 3RD AND 4TH TOE.  SIMILAR 

APPEARANCE INVOLVING THE HEAD OF THE 3RD AND 4TH METATARSALS AND POSSIBLY THE 

2ND.  FINDINGS SUSPICIOUS FOR OSTEOMYELITIS.  FURTHER EVALUATION WITH MRI OF THE

FOOT MAY BE INDICATED.


 














Assessment and Plan





- Diagnosis


(1) Left infected gangrenous foot


Is this a current diagnosis for this admission?: Yes   


Plan: 


The patient was placed on IV antibiotics and seen by surgery.  


A left transmetatarsal amputation was performed; POD #10


Surgery recommends continued IV antibiotics; on IV Levaquin,


Possible d/c tomorrow.


Discharge planning is consulted for home health nursing (wound care) and follow-

up in the wound care clinic.








(2) Anemia


Is this a current diagnosis for this admission?: Yes   


Plan: 


Multifactorial due to post operative blood loss, fluids resulting in 

hemodilution, probable iron defiency. 


He had a precipitous drop in hemoglobin due to all these factors however his 

hemoglobin is stable now at 9.2 and trending up.








(3) Hypertension


Qualifiers: 


   Hypertension type: essential hypertension   Qualified Code(s): I10 - 

Essential (primary) hypertension   


Is this a current diagnosis for this admission?: Yes   


Plan: 


Patient reports a history of hypertension.


He has been normotensive without blood pressure medications this admission.


Continue to monitor and initiate antihypertensives as indicated.








(4) Osteomyelitis of left foot


Qualifiers: 


   Osteomyelitis type: other acute   Qualified Code(s): M86.172 - Other acute 

osteomyelitis, left ankle and foot   


Is this a current diagnosis for this admission?: Yes   


Plan: 


With diabetic foot ulcer post left forefoot amputation. 


Plan is above in #1








(5) Uncontrolled diabetes mellitus


Qualifiers: 


   Diabetes mellitus type: type 2 


Is this a current diagnosis for this admission?: Yes   


Plan: 


A1c 10%.


He is a self-pay; will need to keep this in mind when prescribing his insulin.


Currently on NPH 15 units BID.  Will reduce dose due to hypoglycemic event 

today; NPH 10 units twice daily.


Accu-Cheks before meals and at bedtime with Humalog for sliding scale coverage. 










(6) Alcoholism /alcohol abuse


Is this a current diagnosis for this admission?: Yes   


Plan: 


History of alcohol abuse.  


We will monitor for withdrawal/delirium tremens.


Multivitamin, folic acid, and thiamine supplementation.








- Time


Time Spent with patient: 15-24 minutes


Medications reviewed and adjusted accordingly: Yes


Anticipated discharge: Home

## 2019-03-31 NOTE — PDOC PROGRESS REPORT
Subjective


Progress Note for:: 03/31/19


Reason For Visit: 


LEFT INFECTED WITH GANGRENOUS FOOT








Physical Exam


Vital Signs: 


                                        











Temp Pulse Resp BP Pulse Ox


 


 97.5 F   86   18   121/66   100 


 


 03/31/19 07:58  03/31/19 07:58  03/31/19 07:58  03/31/19 07:58  03/31/19 07:58








                                 Intake & Output











 03/30/19 03/31/19 04/01/19





 06:59 06:59 06:59


 


Intake Total 1053 1275 


 


Output Total 475 550 


 


Balance 578 725 


 


Weight 76.1 kg 77.8 kg 














Results


Laboratory Results: 


                                        





                                 03/28/19 06:23 





                                 03/29/19 05:55 





                                        











  03/14/19 03/14/19





  11:00 11:00


 


Creatine Kinase  28 L 


 


CK-MB (CK-2)   0.34


 


Troponin I   < 0.012











Impressions: 


                                        





Chest X-Ray  03/14/19 11:26


IMPRESSION:  DIFFUSE INTERSTITIAL SCARRING.  NO ACUTE RADIOGRAPHIC FINDING IN 

THE CHEST.


 








Foot X-Ray  03/14/19 11:28


IMPRESSION:  SUPERFICIAL SOFT TISSUE ULCERATION ON THE LATERAL IMAGE.  ON THE AP

AND OBLIQUE IMAGES THERE IS PATCHY OSTEOPENIA AND INDISTINCT APPEARANCE OF THE 

CORTEX AT THE BASE OF THE PROXIMAL PHALANX OF THE 3RD AND 4TH TOE.  SIMILAR 

APPEARANCE INVOLVING THE HEAD OF THE 3RD AND 4TH METATARSALS AND POSSIBLY THE 

2ND.  FINDINGS SUSPICIOUS FOR OSTEOMYELITIS.  FURTHER EVALUATION WITH MRI OF THE

FOOT MAY BE INDICATED.


 














Assessment & Plan





- Diagnosis


(1) Left infected gangrenous foot


Is this a current diagnosis for this admission?: Yes   





(2) Uncontrolled diabetes mellitus


Qualifiers: 


   Diabetes mellitus type: type 2 


Is this a current diagnosis for this admission?: Yes   





- Plan Summary


Plan Summary: 





48-year-old male status post midfoot amputation on the left.  Patient reports 

that he is doing well today.  I have encouraged him to ambulate on the heel, but

the patient reports that it is "hard to do".  I have looked underneath the 

dressing, and there is no sign of active purulence or growing erythema.  

Continue with dressing changes and medical management.  Will follow.

## 2019-04-01 VITALS — DIASTOLIC BLOOD PRESSURE: 74 MMHG | SYSTOLIC BLOOD PRESSURE: 107 MMHG

## 2019-04-01 RX ADMIN — ENOXAPARIN SODIUM SCH MG: 40 INJECTION SUBCUTANEOUS at 09:29

## 2019-04-01 RX ADMIN — INSULIN LISPRO SCH: 100 INJECTION, SOLUTION INTRAVENOUS; SUBCUTANEOUS at 17:32

## 2019-04-01 RX ADMIN — NYSTATIN SCH: 100000 SUSPENSION ORAL at 01:11

## 2019-04-01 RX ADMIN — INSULIN HUMAN SCH UNIT: 100 INJECTION, SUSPENSION SUBCUTANEOUS at 09:28

## 2019-04-01 RX ADMIN — FAMOTIDINE SCH MG: 20 TABLET, FILM COATED ORAL at 09:29

## 2019-04-01 RX ADMIN — INSULIN LISPRO SCH: 100 INJECTION, SOLUTION INTRAVENOUS; SUBCUTANEOUS at 12:00

## 2019-04-01 RX ADMIN — INSULIN HUMAN SCH: 100 INJECTION, SUSPENSION SUBCUTANEOUS at 17:49

## 2019-04-01 RX ADMIN — DOCUSATE SODIUM SCH MG: 100 CAPSULE, LIQUID FILLED ORAL at 09:28

## 2019-04-01 RX ADMIN — DOCUSATE SODIUM SCH: 100 CAPSULE, LIQUID FILLED ORAL at 17:49

## 2019-04-01 RX ADMIN — INSULIN LISPRO SCH: 100 INJECTION, SOLUTION INTRAVENOUS; SUBCUTANEOUS at 09:14

## 2019-04-01 NOTE — PDOC PROGRESS REPORT
Subjective


Progress Note for:: 04/01/19


Subjective:: 





feels will, nol c/o pain


Reason For Visit: 


LEFT INFECTED WITH GANGRENOUS FOOT








Physical Exam


Vital Signs: 


                                        











Temp Pulse Resp BP Pulse Ox


 


 98.1 F   95   18   130/77 H  99 


 


 04/01/19 07:29  04/01/19 07:29  04/01/19 07:29  04/01/19 07:29  04/01/19 07:29








                                 Intake & Output











 03/31/19 04/01/19 04/02/19





 06:59 06:59 06:59


 


Intake Total 1275 653 


 


Output Total 550  


 


Balance 725 653 


 


Weight 77.8 kg  











General appearance: PRESENT: no acute distress


Head exam: PRESENT: normocephalic


Eye exam: PRESENT: EOMI


Mouth exam: PRESENT: moist


Neck exam: PRESENT: full ROM


Respiratory exam: PRESENT: clear to auscultation lorenzo


Cardiovascular exam: PRESENT: RRR


Pulses: PRESENT: normal radial pulses, normal femoral pulses, +1 pedal pulses 

bilateral


GI/Abdominal exam: PRESENT: soft


Rectal exam: PRESENT: deferred


Extremities exam: PRESENT: other - left foot healing no evidence of purulent 

drainage with compression packing 2 small open wounds iwth iodoform penrose 

drain min op no cellulitis


Musculoskeletal exam: PRESENT: ambulatory


Neurological exam: PRESENT: alert, awake, oriented to person, oriented to place,

oriented to time, oriented to situation


Skin exam: PRESENT: dry





Results


Laboratory Results: 


                                        





                                 03/28/19 06:23 





                                 03/29/19 05:55 





                                        











  03/14/19 03/14/19





  11:00 11:00


 


Creatine Kinase  28 L 


 


CK-MB (CK-2)   0.34


 


Troponin I   < 0.012











Impressions: 


                                        





Chest X-Ray  03/14/19 11:26


IMPRESSION:  DIFFUSE INTERSTITIAL SCARRING.  NO ACUTE RADIOGRAPHIC FINDING IN 

THE CHEST.


 








Foot X-Ray  03/14/19 11:28


IMPRESSION:  SUPERFICIAL SOFT TISSUE ULCERATION ON THE LATERAL IMAGE.  ON THE AP

AND OBLIQUE IMAGES THERE IS PATCHY OSTEOPENIA AND INDISTINCT APPEARANCE OF THE 

CORTEX AT THE BASE OF THE PROXIMAL PHALANX OF THE 3RD AND 4TH TOE.  SIMILAR 

APPEARANCE INVOLVING THE HEAD OF THE 3RD AND 4TH METATARSALS AND POSSIBLY THE 

2ND.  FINDINGS SUSPICIOUS FOR OSTEOMYELITIS.  FURTHER EVALUATION WITH MRI OF THE

FOOT MAY BE INDICATED.


 














Assessment & Plan





- Diagnosis


(1) Left infected gangrenous foot


Is this a current diagnosis for this admission?: Yes   





- Plan Summary


Plan Summary: 





left forefoot gangrene


s/p transmet amputation


able to ambulate on heel


wound being packed with iodoform gauze


ok now for pt to be discharged if 


home health can be arranged for at least


daily dressing changes.

## 2019-04-02 NOTE — PDOC DISCHARGE SUMMARY
General





- Admit/Disc Date/PCP


Admission Date/Primary Care Provider: 


  03/14/19 14:10





  





Discharge Date: 04/01/19





- Discharge Diagnosis


(1) Left infected gangrenous foot


Is this a current diagnosis for this admission?: Yes   


Summary: 


The patient was admitted to the medical floor and empirically placed on IV 

Levaquin.  


Surgery was consulted with recommendations for BKA; patient declined and 

requested more conservative interventions.  HE is charito s/p left transmetatarsal 

amputation was performed; POD #11


Blood cultures were negative at 5 days.  Wound cultures revealed Staph xylosus 

and Group B beta strep; sensitive to Levaquin.  Discussed with surgery; does not

require continued therapy post discharge.





On day of discharge, patient is in stable condition with adequately controlled p

ain.


Discharge planning has made arrangements for home health nursing (wound care 

teaching to patient and family member) and physical therapy.


He is to follow up with the [facility] on 4/11/19 and with Dr. Guan (surgery clinic) on 4/5/19.


He is advised to return to the emergency department as needed for any concerning

symptoms.











(2) Anemia


Is this a current diagnosis for this admission?: Yes   


Summary: 


Improved.


Multifactorial due to post operative blood loss, IV fluids resulting in 

hemodilution, and iron deficiency. 


He had a precipitous drop in hemoglobin due but did not require transfusions.


Hgb is now stable at 9.2 and trending up.


No evidence/concern for ongoing blood loss.








(3) Hypertension


Is this a current diagnosis for this admission?: Yes   


Summary: 


Patient reports a history of hypertension.


He has been normotensive without blood pressure medications this admission.





Recommend low sodium diet and routine monitoring/management per PCP.








(4) Osteomyelitis of left foot


Is this a current diagnosis for this admission?: Yes   


Summary: 


With diabetic foot ulcer status post left forefoot amputation. 


Evaluation and management as above.











(5) Uncontrolled diabetes mellitus


Is this a current diagnosis for this admission?: Yes   


Summary: 


A1C 10%





Patient was placed on a consistent carb diet.


His blood glucose was well controlled with NPH twice daily.


He had the opportunity to meet with the registered dietician and diabetic 

educator.





He is discharged with prescriptions for a glucometer kit with lancets and test 

strips, insulin needles, NPH 10 units BID, and Humalog for sliding scale 

coverage.








(6) Alcoholism /alcohol abuse


Is this a current diagnosis for this admission?: Yes   


Summary: 


Hx EtOH abuse.


No symptoms of withdrawal during his admission.





He was supported with p.o. multivitamin, thiamin, and folic acid supplement

ation; recommend that he continue at discharge.








- Additional Information


Resuscitation Status: Full Code


Discharge Diet: Diabetic


Discharge Activity: Activity As Tolerated, Balance Activity w/Rest


Prescriptions: 


Blood-Glucose Meter [Blood Glucose Monitoring] 1 each [facility] ACHS #1 kit


Folic Acid [Folvite 1 mg Tablet] 1 mg PO DAILY #90 tablet


Insulin Lispro [Humalog Insulin (Lispro) 100 unit/mL] 0 - 12 unit SUBCUT ACHS #1

vial


Multivitamin [Tab-A-Ching (Multiple Vitamin) Tablet] 1 tab PO DAILY #90 tablet


NPH, Human Insulin Isophane [Humulin N (NPH) Insulin 100 unit/mL] 10 unit SUBCUT

BIDACBS #1 vial


Oxycodone HCl/Acetaminophen [Percocet 5-325 mg Tablet] 1 tab PO Q6HP PRN #12 

tablet


 PRN Reason: 


Syringe and Needle,Insulin,1Ml [Insulin Syringe] 1 each [facility] ACHS #120 disp.syrin


Thiamine HCl [Thiamine 100 mg Tablet] 100 mg PO DAILY #90 tablet


Home Medications: 








Blood-Glucose Meter [[facility] Gl[facility] 


Docusate Sodium [Colace 100 mg Capsule] 100 mg PO BID  capsule 04/01/19 


Folic Acid [Folvite 1 mg Tablet] 1 mg PO DAILY #90 tablet 04/01/19 


Insulin Lispro [Humalog Insulin (Lispro) 100 unit/mL] 0 - [facility]

vial 04/01/19 


Multivitamin [Tab-A-Ching (Multiple Vitamin) Tablet] 1 tab PO DAILY #90 tablet 0

4/01/19 


NPH, Human Insulin Isophane [Humulin N (NPH) Insulin 100 unit/mL] 10 unit SUBCUT

BIDACBS #1 vial 04/01/19 


Oxycodone HCl/Acetaminophen [Percocet 5-325 mg Tablet] 1 tab PO Q6HP PRN #12 

tablet 04/01/19 


Syringe and Needle,Insulin,1Ml [Insulin Syringe] 1 each [facility] #120 disp.syrin 

04/01/19 


Thiamine HCl [Thiamine 100 mg Tablet] 100 mg PO DAILY #90 tablet 04/01/19 











History of Present Illness


History of Present Illness: 


Per H&P by Dr. Sorenson: BRIANDA SNOW is a 48 year old male male patient

with past medical history of type 2 diabetes mellitus, hypertension, alcoholism 

presents to ED for left foot pain and ulceration.  Patient brought by friends 

and family members.  Reportedly patient has left foot ulcer which has been going

on for the last 6 weeks.  Patient denies any fever chills palpitation or 

diaphoresis.  Patient is a known alcoholic and he drinks on daily basis.  

Patient is also noncompliant with his medication and his hemoglobin A1c 5 years 

ago was 10.8.  Upon my evaluation the patient's left foot is swollen erythematou

s and the second and third toe are gangrenous.  There is also a wound on the 

metatarsal area.  Dr. Kendall consulted by the ER attending to evaluate the 

patient in for possible incision drainage and total debridement.  I think 

patient possibly may need amputation of the gangrenous toe.





Physical Exam


Vital Signs: 


                                        











Temp Pulse Resp BP Pulse Ox


 


 97.8 F   95   15   107/74   100 


 


 04/01/19 18:21  04/01/19 18:21  04/01/19 18:21  04/01/19 18:21  04/01/19 18:21








                                 Intake & Output











 04/01/19 04/02/19 04/03/19





 06:59 06:59 06:59


 


Intake Total 653 355 


 


Output Total  400 


 


Balance 653 -45 











General appearance: PRESENT: no acute distress, well-developed, well-nourished


Head exam: PRESENT: atraumatic, normocephalic


Eye exam: PRESENT: conjunctiva pink, EOMI, PERRLA.  ABSENT: scleral icterus


Mouth exam: PRESENT: moist, tongue midline


Respiratory exam: PRESENT: clear to auscultation lorenzo.  ABSENT: rales, rhonchi, 

wheezes


Cardiovascular exam: PRESENT: RRR.  ABSENT: diastolic murmur, rubs, systolic 

murmur


Pulses: PRESENT: normal dorsalis pedis pul


Vascular exam: PRESENT: normal capillary refill


GI/Abdominal exam: PRESENT: normal bowel sounds, soft.  ABSENT: distended, 

guarding, mass, organolmegaly, rebound, tenderness


Rectal exam: PRESENT: deferred


Extremities exam: PRESENT: full ROM, other - Lt midfoot amputation.  ABSENT: 

calf tenderness, clubbing, pedal edema


Neurological exam: PRESENT: alert, awake, oriented to person, oriented to place,

oriented to time, oriented to situation, CN II-XII grossly intact.  ABSENT: 

motor sensory deficit


Psychiatric exam: PRESENT: appropriate affect, normal mood.  ABSENT: homicidal 

ideation, suicidal ideation


Skin exam: PRESENT: dry, warm.  ABSENT: cyanosis, rash





Results


Laboratory Results: 


                                        





                                 03/28/19 06:23 





                                 03/29/19 05:55 





                                        











  03/14/19 03/14/19





  11:00 11:00


 


Creatine Kinase  28 L 


 


CK-MB (CK-2)   0.34


 


Troponin I   < 0.012











Impressions: 


                                        





Chest X-Ray  03/14/19 11:26


IMPRESSION:  DIFFUSE INTERSTITIAL SCARRING.  NO ACUTE RADIOGRAPHIC FINDING IN 

THE CHEST.


 








Foot X-Ray  03/14/19 11:28


IMPRESSION:  SUPERFICIAL SOFT TISSUE ULCERATION ON THE LATERAL IMAGE.  ON THE AP

AND OBLIQUE IMAGES THERE IS PATCHY OSTEOPENIA AND INDISTINCT APPEARANCE OF THE 

CORTEX AT THE BASE OF THE PROXIMAL PHALANX OF THE 3RD AND 4TH TOE.  SIMILAR 

APPEARANCE INVOLVING THE HEAD OF THE 3RD AND 4TH METATARSALS AND POSSIBLY THE 

2ND.  FINDINGS SUSPICIOUS FOR OSTEOMYELITIS.  FURTHER EVALUATION WITH MRI OF THE

FOOT MAY BE INDICATED.


 














Qualifiers





- *


PATIENT BEING DISCHARGED WITH ANY OF THE FOLLOWING DIAGNOSIS: No

## 2019-04-18 ENCOUNTER — HOSPITAL ENCOUNTER (INPATIENT)
Dept: HOSPITAL 62 - INOR | Age: 49
LOS: 4 days | Discharge: HOME | DRG: 240 | End: 2019-04-22
Attending: SURGERY | Admitting: SURGERY
Payer: SELF-PAY

## 2019-04-18 DIAGNOSIS — E11.52: Primary | ICD-10-CM

## 2019-04-18 DIAGNOSIS — F10.10: ICD-10-CM

## 2019-04-18 DIAGNOSIS — E87.1: ICD-10-CM
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
## 2019-04-18 DIAGNOSIS — Z79.4: ICD-10-CM

## 2019-04-18 DIAGNOSIS — E11.65: ICD-10-CM

## 2019-04-18 DIAGNOSIS — D63.1: ICD-10-CM

## 2019-04-18 DIAGNOSIS — I12.9: ICD-10-CM

## 2019-04-18 DIAGNOSIS — E11.22: ICD-10-CM

## 2019-04-18 DIAGNOSIS — N18.9: ICD-10-CM

## 2019-04-18 DIAGNOSIS — I96: ICD-10-CM

## 2019-04-18 LAB
ANION GAP SERPL CALC-SCNC: 9 MMOL/L (ref 5–19)
BUN SERPL-MCNC: 24 MG/DL (ref 7–20)
CALCIUM: 8.8 MG/DL (ref 8.4–10.2)
CHLORIDE SERPL-SCNC: 100 MMOL/L (ref 98–107)
CO2 SERPL-SCNC: 20 MMOL/L (ref 22–30)
ERYTHROCYTE [DISTWIDTH] IN BLOOD BY AUTOMATED COUNT: 14.6 % (ref 11.5–14)
GLUCOSE SERPL-MCNC: 150 MG/DL (ref 75–110)
HCT VFR BLD CALC: 25.1 % (ref 37.9–51)
HGB BLD-MCNC: 8.7 G/DL (ref 13.5–17)
MCH RBC QN AUTO: 33.8 PG (ref 27–33.4)
MCHC RBC AUTO-ENTMCNC: 34.7 G/DL (ref 32–36)
MCV RBC AUTO: 98 FL (ref 80–97)
PLATELET # BLD: 198 10^3/UL (ref 150–450)
POTASSIUM SERPL-SCNC: 5 MMOL/L (ref 3.6–5)
RBC # BLD AUTO: 2.57 10^6/UL (ref 4.35–5.55)
SODIUM SERPL-SCNC: 128.9 MMOL/L (ref 137–145)
WBC # BLD AUTO: 8.6 10^3/UL (ref 4–10.5)

## 2019-04-18 PROCEDURE — 83735 ASSAY OF MAGNESIUM: CPT

## 2019-04-18 PROCEDURE — 82962 GLUCOSE BLOOD TEST: CPT

## 2019-04-18 PROCEDURE — 85025 COMPLETE CBC W/AUTO DIFF WBC: CPT

## 2019-04-18 PROCEDURE — 86901 BLOOD TYPING SEROLOGIC RH(D): CPT

## 2019-04-18 PROCEDURE — 86900 BLOOD TYPING SEROLOGIC ABO: CPT

## 2019-04-18 PROCEDURE — 36430 TRANSFUSION BLD/BLD COMPNT: CPT

## 2019-04-18 PROCEDURE — 88311 DECALCIFY TISSUE: CPT

## 2019-04-18 PROCEDURE — 01482 ANES OPN LWR L/A/F RAD RESCJ: CPT

## 2019-04-18 PROCEDURE — 80048 BASIC METABOLIC PNL TOTAL CA: CPT

## 2019-04-18 PROCEDURE — 83036 HEMOGLOBIN GLYCOSYLATED A1C: CPT

## 2019-04-18 PROCEDURE — 93005 ELECTROCARDIOGRAM TRACING: CPT

## 2019-04-18 PROCEDURE — 0Y6J0Z2 DETACHMENT AT LEFT LOWER LEG, MID, OPEN APPROACH: ICD-10-PCS | Performed by: SURGERY

## 2019-04-18 PROCEDURE — 88307 TISSUE EXAM BY PATHOLOGIST: CPT

## 2019-04-18 PROCEDURE — 36415 COLL VENOUS BLD VENIPUNCTURE: CPT

## 2019-04-18 PROCEDURE — 86850 RBC ANTIBODY SCREEN: CPT

## 2019-04-18 PROCEDURE — L1830 KO IMMOB CANVAS LONG PRE OTS: HCPCS

## 2019-04-18 PROCEDURE — 80061 LIPID PANEL: CPT

## 2019-04-18 PROCEDURE — 80053 COMPREHEN METABOLIC PANEL: CPT

## 2019-04-18 PROCEDURE — 86920 COMPATIBILITY TEST SPIN: CPT

## 2019-04-18 PROCEDURE — P9016 RBC LEUKOCYTES REDUCED: HCPCS

## 2019-04-18 PROCEDURE — 85027 COMPLETE CBC AUTOMATED: CPT

## 2019-04-18 PROCEDURE — 93010 ELECTROCARDIOGRAM REPORT: CPT

## 2019-04-18 RX ADMIN — MULTIVITAMIN TABLET SCH TAB: TABLET at 15:46

## 2019-04-18 RX ADMIN — PIPERACILLIN AND TAZOBACTAM SCH MLS/HR: 3; .375 INJECTION, POWDER, LYOPHILIZED, FOR SOLUTION INTRAVENOUS; PARENTERAL at 15:46

## 2019-04-18 RX ADMIN — SODIUM CHLORIDE PRN MLS/HR: 9 INJECTION, SOLUTION INTRAVENOUS at 21:51

## 2019-04-18 RX ADMIN — FOLIC ACID SCH MG: 1 TABLET ORAL at 15:47

## 2019-04-18 RX ADMIN — MORPHINE SULFATE PRN MG: 10 INJECTION INTRAMUSCULAR; INTRAVENOUS; SUBCUTANEOUS at 13:16

## 2019-04-18 RX ADMIN — INSULIN HUMAN SCH UNIT: 100 INJECTION, SOLUTION PARENTERAL at 15:47

## 2019-04-18 RX ADMIN — PIPERACILLIN AND TAZOBACTAM SCH MLS/HR: 3; .375 INJECTION, POWDER, LYOPHILIZED, FOR SOLUTION INTRAVENOUS; PARENTERAL at 21:51

## 2019-04-18 RX ADMIN — Medication SCH MG: at 15:46

## 2019-04-18 RX ADMIN — FAMOTIDINE SCH MG: 20 TABLET, FILM COATED ORAL at 21:51

## 2019-04-18 RX ADMIN — MORPHINE SULFATE PRN MG: 10 INJECTION INTRAMUSCULAR; INTRAVENOUS; SUBCUTANEOUS at 21:50

## 2019-04-18 RX ADMIN — LORAZEPAM PRN MG: 2 INJECTION INTRAMUSCULAR; INTRAVENOUS at 15:46

## 2019-04-18 RX ADMIN — INSULIN HUMAN SCH UNIT: 100 INJECTION, SUSPENSION SUBCUTANEOUS at 15:47

## 2019-04-18 RX ADMIN — SODIUM CHLORIDE PRN MLS/HR: 9 INJECTION, SOLUTION INTRAVENOUS at 13:16

## 2019-04-18 RX ADMIN — ATORVASTATIN CALCIUM SCH MG: 10 TABLET, FILM COATED ORAL at 21:51

## 2019-04-18 RX ADMIN — INSULIN HUMAN SCH: 100 INJECTION, SOLUTION PARENTERAL at 14:44

## 2019-04-18 NOTE — OPERATIVE REPORT E
Operative Report



NAME: BRIANDA SNOW

MRN:  [id number]          : 1970 AGE:  48Y

DATE OF SURGERY: 2019                        ROOM: 406



PREOPERATIVE DIAGNOSIS:

GANGRENE LEFT FOOT.



POSTOPERATIVE DIAGNOSIS:

GANGRENE LEFT FOOT.



OPERATION:

LEFT BELOW KNEE AMPUTATION.



SURGEON:

BACILIO SHEPARD M.D.



PROCEDURE:

the patient was brought to the operating room, awake, alert, and in stable

condition.  Given a spinal anesthetic.  His left lower extremity was

prepped and draped in the usual sterile manner for the procedure.  5 cm

from the patella, a tennis racquet type incision was made with a longer

posterior flap.  This was done with a 10 blade, and we then carried our

dissection down through subcutaneous tissue with Bovie cautery until we

reached the muscle compartments.  The fascia over the muscle compartments

laterally and medially were divided with Bovie cautery.  Once this was

done, we began our dissection through the anterior calf muscles with Bovie

cautery until reaching the anterior tibial artery, which was doubly

ligated with 0 Vicryl suture and divided.  Similarly, the vein and nerve

were handled that way, as was the posterior tibial artery and the peroneal

artery.  Once this was accomplished, we then came through the lateral

muscle compartments with Bovie cautery.  Anteriorly, we quickly identified

the tibia and we used the periosteal elevator to left the periosteum off

the tibia proximally, where we divided it with the bone saw.  We continued

our dissection using the distal tibia as a lever to retract the distal

lower extremity.  We came across the soleus muscle, similarly with Bovie

cautery as well as the gastrocnemius muscles.  We divided the fibula with

the bone saw and handled the saphenous veins and nerve bundles with clamps

and 0 Vicryl ties.  Once this was accomplished and the lower extremity was

removed, we irrigated copiously with normal saline and suctioned dry.  We

trimmed the soleus muscle back so that we could adequately close the

posterior flap in an anterior position.  We then closed the deep fascia

with interrupted 2-0 Vicryl sutures, and then the superficial fascia was

closed similarly.  The skin was then closed transversely under no tension

with stainless steel staples.  A Xeroform gauze was applied to the suture

line, and then the leg was wrapped with a compression bandage, which

completed the procedure.  The patient was awakened in the operating room

and transferred to the recovery room in stable condition.  Estimated blood

loss for the procedure was approximately 200 mL.  Sponge and needle counts

were correct x2.



DICTATING PHYSICIAN:  BACILIO SHEPARD M.D.





[id number]                  DT: 2019    [id number]

PHY#: [id number]            DD: 2019    1403

ID:   [id number]           JOB#: [id number]       ACCT: [id number]



cc:BACILIO SHEPARD M.D.

>

## 2019-04-18 NOTE — OPERATIVE REPORT
Nonrecallable Operative Report


DATE OF SURGERY: 04/18/19


PREOPERATIVE DIAGNOSIS: diabetic foot infection


POSTOPERATIVE DIAGNOSIS: diabetic foot infection


OPERATION: left bka


SURGEON: BACILIO SHEPARD


ANESTHESIA: Spinal


TISSUE REMOVED OR ALTERED: left bka


COMPLICATIONS: 





none


ESTIMATED BLOOD LOSS: 200


INTRAOPERATIVE FINDINGS: see dictation


PROCEDURE: 





see dictation

## 2019-04-18 NOTE — EKG REPORT
SEVERITY:- BORDERLINE ECG -

BORDERLINE LEFT AXIS DEVIATION

SINUS RHYTHM

:

Confirmed by: Sandra Goel MD 18-Apr-2019 15:33:57

## 2019-04-18 NOTE — PDOC CONSULTATION
Consultation


Consult Date: 04/18/19


Attending physician:: BACILIO SHEPARD


Consult reason:: dm





History of Present Illness


Admission Date/PCP: 


  04/18/19 07:29





  





Patient complains of: Status post left BKA


History of Present Illness: 


BRIANDA SNOW is a 48 year old male


With history of type 2 diabetes mellitus, hypertension, alcohol abuse admitted 

to the floor after left BKA 5.left foot gangrene.  Medical consult was called 

for management of the diabetes.  Patient is presently receiving IV morphine 4 mg

IV every 4 as needed still complaining of pain scale of 5/10.  Patient has other

complaints.





Past Medical History


Cardiac Medical History: Reports: Hypertension


   Denies: Atrial Fibrillation, Congestive Heart Failure, Coronary Artery 

Disease, Myocardial Infarction, Hyperlipidema, Peripheral Vascular Disease, 

Pulmonary Embolism, Heart Murmur


Pulmonary Medical History: 


   Denies: Asthma, Bronchitis, Chronic Obstructive Pulmonary Disease (COPD), 

Pneumonia, Respiratory Failure, Sleep Apnea, Tuberculosis


Neurological Medical History: 


   Denies: Seizures


Endocrine Medical History: Reports: Diabetes Mellitus Type 2


   Denies: Hyperthyroidism, Hypothyroidism


Renal/ Medical History: 


   Denies: End Stage Renal Disease


Malignancy Medical History: 


   Denies: Leukemia, Lung Cancer


GI Medical History: 


   Denies: Crohn's Disease, Gastroesophageal Reflux Disease, Hiatal Hernia


Musculoskeltal Medical History: 


   Denies: Arthritis, Fibromyalgia


Psychiatric Medical History: Reports: Alcohol Dependency


   Denies: Dementia, Depression


Hematology: 


   Denies: Anemia, Hemophilia, Sickle Cell Disease


Infectious Medical History: 


   Denies: HIV





Past Surgical History


Past Surgical History: 


   Denies: Appendectomy, Cholecystectomy, Colostomy, Coronary Artery Bypass 

Graft, Gastric Bypass Surgery, Herniorrhaphy, Pacemaker, Tonsillectomy





Social History


Information Source: Patient


Lives with: Family


Smoking Status: Never Smoker


Frequency of Alcohol Use: Heavy


Hx Recreational Drug Use: No


Drugs: None


Hx Prescription Drug Abuse: No





Family History


Family History: Reviewed & Not Pertinent


Parental Family History Reviewed: Yes - Family history of diabetes


Children Family History Reviewed: Yes


Sibling(s) Family History Reviewed.: Yes





Medication/Allergy


Home Medications: 








Folic Acid [Folvite 1 mg Tablet] 1 mg PO DAILY #90 tablet 04/01/19 


Insulin Lispro [Humalog Insulin (Lispro) 100 unit/mL] 0 - 12 unit SUBCUT ACHS #1

vial 04/01/19 


Multivitamin [Tab-A-Ching (Multiple Vitamin) Tablet] 1 tab PO DAILY #90 tablet 

04/01/19 


NPH, Human Insulin Isophane [Humulin N (NPH) Insulin 100 unit/mL] 10 unit SUBCUT

BIDACBS #1 vial 04/01/19 


Thiamine HCl [Thiamine 100 mg Tablet] 100 mg PO DAILY #90 tablet 04/01/19 








Allergies/Adverse Reactions: 


                                        





No Known Allergies Allergy (Verified 03/14/19 10:15)


   











Review of Systems


Constitutional: ABSENT: fever(s), weakness, weight gain, weight loss


Eyes: ABSENT: visual disturbances


Ears: ABSENT: hearing changes


Nose, Mouth, and Throat: ABSENT: sore throat


Respiratory: ABSENT: dyspnea, hemoptysis


Gastrointestinal: ABSENT: diarrhea, nausea, vomiting


Musculoskeletal: PRESENT: other - Left stump pain


Neurological: ABSENT: abnormal gait, abnormal speech, confusion, dizziness, 

focal weakness, syncope


Psychiatric: ABSENT: anxiety, depression, homidical ideation, suicidal ideation





Physical Exam


Vital Signs: 


                                        











Temp Pulse Resp BP Pulse Ox


 


 97.5 F   91   16   155/80 H  98 


 


 04/18/19 14:00  04/18/19 14:00  04/18/19 14:00  04/18/19 14:00  04/18/19 14:00








                                 Intake & Output











 04/17/19 04/18/19 04/19/19





 06:59 06:59 06:59


 


Intake Total   2850


 


Output Total   800


 


Balance   2050


 


Weight  75 kg 











General appearance: PRESENT: obese, other - In moderate distress


Head exam: PRESENT: atraumatic


Eye exam: PRESENT: PERRLA


Mouth exam: PRESENT: moist, tongue midline


Neck exam: ABSENT: carotid bruit, JVD, lymphadenopathy, thyromegaly


Respiratory exam: PRESENT: decreased breath sounds


Cardiovascular exam: PRESENT: tachycardia


GI/Abdominal exam: PRESENT: normal bowel sounds, soft.  ABSENT: distended, 

guarding, mass, organolmegaly, rebound, tenderness


Extremities exam: PRESENT: other - Left BKA


Neurological exam: PRESENT: alert, awake, oriented to person, oriented to place,

oriented to time, oriented to situation, CN II-XII grossly intact.  ABSENT: 

motor sensory deficit


Psychiatric exam: PRESENT: appropriate affect, normal mood.  ABSENT: homicidal 

ideation, suicidal ideation





Results


Laboratory Results: 


                                        





                                 04/18/19 08:30 





                                 04/18/19 08:30 





                                        











  04/18/19 04/18/19





  08:30 08:30


 


WBC  8.6 


 


RBC  2.57 L 


 


Hgb  8.7 L 


 


Hct  25.1 L 


 


MCV  98 H 


 


MCH  33.8 H 


 


MCHC  34.7 


 


RDW  14.6 H 


 


Plt Count  198 


 


Sodium   128.9 L


 


Potassium   5.0


 


Chloride   100


 


Carbon Dioxide   20 L


 


Anion Gap   9


 


BUN   24 H


 


Creatinine   1.32 H


 


Est GFR ( Amer)   > 60


 


Est GFR (Non-Af Amer)   58 L


 


Glucose   150 H


 


Calcium   8.8














Assessment and Plan





- Diagnosis


(1) Left infected gangrenous foot


Is this a current diagnosis for this admission?: Yes   


Plan: 


4/18/2019-patient was admitted for left ankle and his foot status post BKA.  

Medical consult was called for management of diabetes.  Patient given the pain s

gerardo of 5 x 10 on morphine 4 mg IV every 4 as needed for pain.  Pain management 

as per surgical team.  On Zosyn 3.375 g 6hrs








(2) Type 2 DM with CKD and hypertension


Is this a current diagnosis for this admission?: No   


Plan: 


4/18/2019-patient has a history of type 2 diabetes mellitus in association with 

chronic kidney disease and hypertension.  Patient is noncompliant to medication.

 Presently on Lantus 10 units twice a day at home and insulin sliding scale 

plans to continue  the same treatment.  Started on diabetic diet dietary consult

was requested.








(3) Hypertension


Qualifiers: 


   Hypertension type: essential hypertension   Qualified [facility](s[facility]

l (primary) hypertension   


Is this a current diagnosis for this admission?: No   


Plan: 


4/18/2019-patient is given history of hypertension but not take any home 

medications.  Started on lisinopril 2.5 mg p.o. daily.  Latest blood pressure is

155/80.  He is receiving IV fluids at 125 cc/h.








(4) Anemia


Is this a current diagnosis for this admission?: No   


Plan: 


4/18/2019-patient's hemoglobin is 8.7.  Gradual decline in hemoglobin since 

February.  Anemia of chronic disease may be secondary to CKD.  Plan to repeat 

hemoglobin tomorrow if necessary plan to give 1 unit of PRBC.








(5) Hyponatremia


Is this a current diagnosis for this admission?: Yes   


Plan: 


4/18/2019-patient's serum sodium is 128.9.  Hyper natremia most likely secondary

to heavy alcohol use.  Plan is to closely monitor the serum sodium levels.  Cu

rrently is on IV fluids normal saline at 125 cc/h.








(6) Alcoholism /alcohol abuse


Is this a current diagnosis for this admission?: Yes   


Plan: 


4/18/2019-patient history of heavy alcohol use.  To start him on Ativan 1 mg IV 

every 4 as needed to watch for the DTs.








(7) CKD (chronic kidney disease)


Is this a current diagnosis for this admission?: Yes   


Plan: 


4/18/2019-patient's creatinine is 1.32.  GFR is more than 60.  Chronic CKD may 

be secondary to uncontrolled diabetes.








- Time


Time Spent with patient: 25-34 minutes


Medications reviewed and adjusted accordingly: Yes


Anticipated discharge: SNF

## 2019-04-19 LAB
ADD MANUAL DIFF: NO
ALBUMIN SERPL-MCNC: 2.5 G/DL (ref 3.5–5)
ALP SERPL-CCNC: 70 U/L (ref 38–126)
ALT SERPL-CCNC: 12 U/L (ref 21–72)
ANION GAP SERPL CALC-SCNC: 7 MMOL/L (ref 5–19)
AST SERPL-CCNC: 21 U/L (ref 17–59)
BASOPHILS # BLD AUTO: 0 10^3/UL (ref 0–0.2)
BASOPHILS NFR BLD AUTO: 0 % (ref 0–2)
BILIRUB DIRECT SERPL-MCNC: 0.3 MG/DL (ref 0–0.4)
BILIRUB SERPL-MCNC: 0.5 MG/DL (ref 0.2–1.3)
BUN SERPL-MCNC: 22 MG/DL (ref 7–20)
CALCIUM: 8.5 MG/DL (ref 8.4–10.2)
CHLORIDE SERPL-SCNC: 108 MMOL/L (ref 98–107)
CHOLEST SERPL-MCNC: 124.34 MG/DL (ref 0–200)
CO2 SERPL-SCNC: 19 MMOL/L (ref 22–30)
EOSINOPHIL # BLD AUTO: 0 10^3/UL (ref 0–0.6)
EOSINOPHIL NFR BLD AUTO: 0 % (ref 0–6)
ERYTHROCYTE [DISTWIDTH] IN BLOOD BY AUTOMATED COUNT: 14.2 % (ref 11.5–14)
ERYTHROCYTE [DISTWIDTH] IN BLOOD BY AUTOMATED COUNT: 14.3 % (ref 11.5–14)
GLUCOSE SERPL-MCNC: 195 MG/DL (ref 75–110)
HCT VFR BLD CALC: 20.3 % (ref 37.9–51)
HCT VFR BLD CALC: 20.5 % (ref 37.9–51)
HGB BLD-MCNC: 7 G/DL (ref 13.5–17)
HGB BLD-MCNC: 7 G/DL (ref 13.5–17)
LDLC SERPL DIRECT ASSAY-MCNC: 67 MG/DL (ref ?–100)
LYMPHOCYTES # BLD AUTO: 0.6 10^3/UL (ref 0.5–4.7)
LYMPHOCYTES NFR BLD AUTO: 5.9 % (ref 13–45)
MCH RBC QN AUTO: 33.5 PG (ref 27–33.4)
MCH RBC QN AUTO: 33.8 PG (ref 27–33.4)
MCHC RBC AUTO-ENTMCNC: 34.3 G/DL (ref 32–36)
MCHC RBC AUTO-ENTMCNC: 34.3 G/DL (ref 32–36)
MCV RBC AUTO: 98 FL (ref 80–97)
MCV RBC AUTO: 99 FL (ref 80–97)
MONOCYTES # BLD AUTO: 0.6 10^3/UL (ref 0.1–1.4)
MONOCYTES NFR BLD AUTO: 5.2 % (ref 3–13)
NEUTROPHILS # BLD AUTO: 9.7 10^3/UL (ref 1.7–8.2)
NEUTS SEG NFR BLD AUTO: 88.9 % (ref 42–78)
PLATELET # BLD: 179 10^3/UL (ref 150–450)
PLATELET # BLD: 220 10^3/UL (ref 150–450)
POTASSIUM SERPL-SCNC: 4.8 MMOL/L (ref 3.6–5)
PROT SERPL-MCNC: 6 G/DL (ref 6.3–8.2)
RBC # BLD AUTO: 2.06 10^6/UL (ref 4.35–5.55)
RBC # BLD AUTO: 2.11 10^6/UL (ref 4.35–5.55)
SODIUM SERPL-SCNC: 133.9 MMOL/L (ref 137–145)
TOTAL CELLS COUNTED % (AUTO): 100 %
TRIGL SERPL-MCNC: 55 MG/DL (ref ?–150)
VLDLC SERPL CALC-MCNC: 11 MG/DL (ref 10–31)
WBC # BLD AUTO: 10.9 10^3/UL (ref 4–10.5)
WBC # BLD AUTO: 11.9 10^3/UL (ref 4–10.5)

## 2019-04-19 PROCEDURE — 30233N1 TRANSFUSION OF NONAUTOLOGOUS RED BLOOD CELLS INTO PERIPHERAL VEIN, PERCUTANEOUS APPROACH: ICD-10-PCS | Performed by: SURGERY

## 2019-04-19 RX ADMIN — INSULIN HUMAN SCH: 100 INJECTION, SOLUTION PARENTERAL at 02:38

## 2019-04-19 RX ADMIN — LISINOPRIL SCH MG: 5 TABLET ORAL at 10:34

## 2019-04-19 RX ADMIN — FOLIC ACID SCH MG: 1 TABLET ORAL at 10:41

## 2019-04-19 RX ADMIN — INSULIN HUMAN SCH UNIT: 100 INJECTION, SUSPENSION SUBCUTANEOUS at 18:10

## 2019-04-19 RX ADMIN — PIPERACILLIN AND TAZOBACTAM SCH MLS/HR: 3; .375 INJECTION, POWDER, LYOPHILIZED, FOR SOLUTION INTRAVENOUS; PARENTERAL at 08:24

## 2019-04-19 RX ADMIN — ATORVASTATIN CALCIUM SCH MG: 10 TABLET, FILM COATED ORAL at 21:46

## 2019-04-19 RX ADMIN — Medication SCH MG: at 10:34

## 2019-04-19 RX ADMIN — FAMOTIDINE SCH MG: 20 TABLET, FILM COATED ORAL at 21:46

## 2019-04-19 RX ADMIN — MULTIVITAMIN TABLET SCH TAB: TABLET at 10:34

## 2019-04-19 RX ADMIN — PIPERACILLIN AND TAZOBACTAM SCH MLS/HR: 3; .375 INJECTION, POWDER, LYOPHILIZED, FOR SOLUTION INTRAVENOUS; PARENTERAL at 03:06

## 2019-04-19 RX ADMIN — INSULIN HUMAN SCH: 100 INJECTION, SOLUTION PARENTERAL at 21:38

## 2019-04-19 RX ADMIN — INSULIN HUMAN SCH UNIT: 100 INJECTION, SUSPENSION SUBCUTANEOUS at 08:24

## 2019-04-19 RX ADMIN — INSULIN HUMAN SCH UNIT: 100 INJECTION, SOLUTION PARENTERAL at 12:03

## 2019-04-19 RX ADMIN — INSULIN HUMAN SCH: 100 INJECTION, SOLUTION PARENTERAL at 16:31

## 2019-04-19 RX ADMIN — MORPHINE SULFATE PRN MG: 10 INJECTION INTRAMUSCULAR; INTRAVENOUS; SUBCUTANEOUS at 21:46

## 2019-04-19 RX ADMIN — PIPERACILLIN AND TAZOBACTAM SCH MLS/HR: 3; .375 INJECTION, POWDER, LYOPHILIZED, FOR SOLUTION INTRAVENOUS; PARENTERAL at 18:11

## 2019-04-19 RX ADMIN — FAMOTIDINE SCH MG: 20 TABLET, FILM COATED ORAL at 10:34

## 2019-04-19 RX ADMIN — INSULIN HUMAN SCH UNIT: 100 INJECTION, SOLUTION PARENTERAL at 08:25

## 2019-04-19 RX ADMIN — LORAZEPAM PRN MG: 2 INJECTION INTRAMUSCULAR; INTRAVENOUS at 03:06

## 2019-04-19 RX ADMIN — MORPHINE SULFATE PRN MG: 10 INJECTION INTRAMUSCULAR; INTRAVENOUS; SUBCUTANEOUS at 15:00

## 2019-04-19 RX ADMIN — PIPERACILLIN AND TAZOBACTAM SCH: 3; .375 INJECTION, POWDER, LYOPHILIZED, FOR SOLUTION INTRAVENOUS; PARENTERAL at 16:20

## 2019-04-19 NOTE — PDOC PROGRESS REPORT
Subjective


Progress Note for:: 04/19/19


Subjective:: 





feels ok, min pain


tried to get out of bed last pm


pulled off dressing


struck stump on side of bed


some bleeding


Reason For Visit: 


DIABETIC FOOT INFECTION








Physical Exam


Vital Signs: 


                                        











Temp Pulse Resp BP Pulse Ox


 


 98.0 F   106 H  18   175/77 H  100 


 


 04/19/19 08:14  04/19/19 08:14  04/19/19 08:14  04/19/19 08:14  04/19/19 08:14








                                 Intake & Output











 04/18/19 04/19/19 04/20/19





 06:59 06:59 06:59


 


Intake Total  5150 


 


Output Total  3675 


 


Balance  1475 


 


Weight 75 kg 74.3 kg 











General appearance: PRESENT: no acute distress


Head exam: PRESENT: normocephalic


Eye exam: PRESENT: EOMI


Mouth exam: PRESENT: moist


Neck exam: PRESENT: full ROM


Respiratory exam: PRESENT: clear to auscultation lorenzo


Cardiovascular exam: PRESENT: RRR, tachycardia


Pulses: PRESENT: normal radial pulses, normal femoral pulses


Vascular exam: PRESENT: normal capillary refill


GI/Abdominal exam: PRESENT: soft


Rectal exam: PRESENT: deferred


Extremities exam: PRESENT: other - left stump skin clean, good cap refill 

missing 2 staples mid incision


Neurological exam: PRESENT: alert, awake, oriented to person


Skin exam: PRESENT: dry





Results


Laboratory Results: 


                                        





                                 04/19/19 08:18 





                                 04/19/19 05:30 





                                        











  04/19/19 04/19/19 04/19/19





  05:30 05:30 08:18


 


WBC  10.9 H   11.9 H


 


RBC  2.06 L   2.11 L


 


Hgb  7.0 L   7.0 L


 


Hct  20.3 L   20.5 L


 


MCV  99 H   98 H


 


MCH  33.8 H   33.5 H


 


MCHC  34.3   34.3


 


RDW  14.3 H   14.2 H


 


Plt Count  179   220


 


Seg Neutrophils %  88.9 H  


 


Lymphocytes %  5.9 L  


 


Monocytes %  5.2  


 


Eosinophils %  0.0  


 


Basophils %  0.0  


 


Absolute Neutrophils  9.7 H  


 


Absolute Lymphocytes  0.6  


 


Absolute Monocytes  0.6  


 


Absolute Eosinophils  0.0  


 


Absolute Basophils  0.0  


 


Sodium   133.9 L 


 


Potassium   4.8 


 


Chloride   108 H 


 


Carbon Dioxide   19 L 


 


Anion Gap   7 


 


BUN   22 H 


 


Creatinine   1.00 


 


Est GFR ( Amer)   > 60 


 


Est GFR (Non-Af Amer)   > 60 


 


Glucose   195 H 


 


Calcium   8.5 


 


Magnesium   2.0 


 


Total Bilirubin   0.5 


 


AST   21 


 


ALT   12 L 


 


Alkaline Phosphatase   70 


 


Total Protein   6.0 L 


 


Albumin   2.5 L 


 


Triglycerides   55 


 


Cholesterol   124.34 


 


LDL Cholesterol Direct   67 


 


VLDL Cholesterol   11.0 


 


HDL Cholesterol   40 














Assessment & Plan





- Plan Summary


Plan Summary: 





s/p left bka for gangrene of left foot


struck stump against bed rail last pm


some bleeding, lost 2 staples


currently this am hb noted to be 7.0


down from 8.7 pre op


most probably from op blood loss 


and hydration


will txn 2 units of prbc, due to sl tachycardia


will resuture wound at bedside


physical rx and occupational med consult

## 2019-04-20 LAB
ADD MANUAL DIFF: NO
ALBUMIN SERPL-MCNC: 2.4 G/DL (ref 3.5–5)
ALP SERPL-CCNC: 70 U/L (ref 38–126)
ALT SERPL-CCNC: 17 U/L (ref 21–72)
ANION GAP SERPL CALC-SCNC: 9 MMOL/L (ref 5–19)
AST SERPL-CCNC: 22 U/L (ref 17–59)
BASOPHILS # BLD AUTO: 0 10^3/UL (ref 0–0.2)
BASOPHILS NFR BLD AUTO: 0.2 % (ref 0–2)
BILIRUB DIRECT SERPL-MCNC: 0.4 MG/DL (ref 0–0.4)
BILIRUB SERPL-MCNC: 0.9 MG/DL (ref 0.2–1.3)
BUN SERPL-MCNC: 16 MG/DL (ref 7–20)
CALCIUM: 8.3 MG/DL (ref 8.4–10.2)
CHLORIDE SERPL-SCNC: 110 MMOL/L (ref 98–107)
CO2 SERPL-SCNC: 20 MMOL/L (ref 22–30)
EOSINOPHIL # BLD AUTO: 0 10^3/UL (ref 0–0.6)
EOSINOPHIL NFR BLD AUTO: 0.3 % (ref 0–6)
ERYTHROCYTE [DISTWIDTH] IN BLOOD BY AUTOMATED COUNT: 16.7 % (ref 11.5–14)
GLUCOSE SERPL-MCNC: 73 MG/DL (ref 75–110)
HCT VFR BLD CALC: 23.7 % (ref 37.9–51)
HGB BLD-MCNC: 8.2 G/DL (ref 13.5–17)
LYMPHOCYTES # BLD AUTO: 1.8 10^3/UL (ref 0.5–4.7)
LYMPHOCYTES NFR BLD AUTO: 19.8 % (ref 13–45)
MCH RBC QN AUTO: 32.7 PG (ref 27–33.4)
MCHC RBC AUTO-ENTMCNC: 34.7 G/DL (ref 32–36)
MCV RBC AUTO: 94 FL (ref 80–97)
MONOCYTES # BLD AUTO: 0.7 10^3/UL (ref 0.1–1.4)
MONOCYTES NFR BLD AUTO: 7.8 % (ref 3–13)
NEUTROPHILS # BLD AUTO: 6.4 10^3/UL (ref 1.7–8.2)
NEUTS SEG NFR BLD AUTO: 71.9 % (ref 42–78)
PLATELET # BLD: 180 10^3/UL (ref 150–450)
POTASSIUM SERPL-SCNC: 4.2 MMOL/L (ref 3.6–5)
PROT SERPL-MCNC: 5.9 G/DL (ref 6.3–8.2)
RBC # BLD AUTO: 2.52 10^6/UL (ref 4.35–5.55)
SODIUM SERPL-SCNC: 139 MMOL/L (ref 137–145)
TOTAL CELLS COUNTED % (AUTO): 100 %
WBC # BLD AUTO: 8.9 10^3/UL (ref 4–10.5)

## 2019-04-20 RX ADMIN — INSULIN HUMAN SCH: 100 INJECTION, SOLUTION PARENTERAL at 09:11

## 2019-04-20 RX ADMIN — FAMOTIDINE SCH MG: 20 TABLET, FILM COATED ORAL at 10:00

## 2019-04-20 RX ADMIN — INSULIN HUMAN SCH: 100 INJECTION, SUSPENSION SUBCUTANEOUS at 18:01

## 2019-04-20 RX ADMIN — INSULIN HUMAN SCH: 100 INJECTION, SOLUTION PARENTERAL at 23:02

## 2019-04-20 RX ADMIN — INSULIN HUMAN SCH UNIT: 100 INJECTION, SUSPENSION SUBCUTANEOUS at 09:59

## 2019-04-20 RX ADMIN — MULTIVITAMIN TABLET SCH TAB: TABLET at 10:00

## 2019-04-20 RX ADMIN — PIPERACILLIN AND TAZOBACTAM SCH MLS/HR: 3; .375 INJECTION, POWDER, LYOPHILIZED, FOR SOLUTION INTRAVENOUS; PARENTERAL at 05:52

## 2019-04-20 RX ADMIN — MORPHINE SULFATE PRN MG: 10 INJECTION INTRAMUSCULAR; INTRAVENOUS; SUBCUTANEOUS at 22:59

## 2019-04-20 RX ADMIN — PIPERACILLIN AND TAZOBACTAM SCH MLS/HR: 3; .375 INJECTION, POWDER, LYOPHILIZED, FOR SOLUTION INTRAVENOUS; PARENTERAL at 00:20

## 2019-04-20 RX ADMIN — PIPERACILLIN AND TAZOBACTAM SCH MLS/HR: 3; .375 INJECTION, POWDER, LYOPHILIZED, FOR SOLUTION INTRAVENOUS; PARENTERAL at 12:30

## 2019-04-20 RX ADMIN — ATORVASTATIN CALCIUM SCH MG: 10 TABLET, FILM COATED ORAL at 23:01

## 2019-04-20 RX ADMIN — FOLIC ACID SCH MG: 1 TABLET ORAL at 09:59

## 2019-04-20 RX ADMIN — INSULIN HUMAN SCH: 100 INJECTION, SOLUTION PARENTERAL at 11:53

## 2019-04-20 RX ADMIN — Medication SCH MG: at 10:00

## 2019-04-20 RX ADMIN — MORPHINE SULFATE PRN MG: 10 INJECTION INTRAMUSCULAR; INTRAVENOUS; SUBCUTANEOUS at 10:00

## 2019-04-20 RX ADMIN — MORPHINE SULFATE PRN MG: 10 INJECTION INTRAMUSCULAR; INTRAVENOUS; SUBCUTANEOUS at 05:52

## 2019-04-20 RX ADMIN — FAMOTIDINE SCH MG: 20 TABLET, FILM COATED ORAL at 23:01

## 2019-04-20 RX ADMIN — LISINOPRIL SCH MG: 5 TABLET ORAL at 10:00

## 2019-04-20 RX ADMIN — INSULIN HUMAN SCH: 100 INJECTION, SOLUTION PARENTERAL at 16:18

## 2019-04-20 RX ADMIN — MORPHINE SULFATE PRN MG: 10 INJECTION INTRAMUSCULAR; INTRAVENOUS; SUBCUTANEOUS at 17:57

## 2019-04-20 RX ADMIN — PIPERACILLIN AND TAZOBACTAM SCH MLS/HR: 3; .375 INJECTION, POWDER, LYOPHILIZED, FOR SOLUTION INTRAVENOUS; PARENTERAL at 17:54

## 2019-04-20 NOTE — PDOC PROGRESS REPORT
Subjective


Progress Note for:: 04/20/19


Subjective:: 





comfortable. no pain


Reason For Visit: 


DIABETIC FOOT INFECTION








Physical Exam


Vital Signs: 


                                        











Temp Pulse Resp BP Pulse Ox


 


 98.1 F   97   17   146/70 H  100 


 


 04/20/19 08:00  04/20/19 08:00  04/20/19 08:00  04/20/19 08:00  04/20/19 08:00








                                 Intake & Output











 04/19/19 04/20/19 04/21/19





 06:59 06:59 06:59


 


Intake Total 5150 3760 


 


Output Total 3675 2150 


 


Balance 1475 1610 


 


Weight 74.3 kg 74.9 kg 











General appearance: PRESENT: no acute distress


Head exam: PRESENT: normocephalic


Eye exam: PRESENT: EOMI


Mouth exam: PRESENT: moist


Neck exam: PRESENT: full ROM


Respiratory exam: PRESENT: clear to auscultation lorenzo


Cardiovascular exam: PRESENT: RRR


Pulses: PRESENT: normal radial pulses, normal femoral pulses


GI/Abdominal exam: PRESENT: soft


Rectal exam: PRESENT: deferred


Extremities exam: PRESENT: calf tenderness, other - stump clean,suture line 

intact


Musculoskeletal exam: PRESENT: full ROM, other - bed rest for now


Neurological exam: PRESENT: alert, awake, oriented to person, oriented to time


Skin exam: PRESENT: dry





Results


Laboratory Results: 


                                        





                                 04/20/19 03:28 





                                 04/20/19 03:28 





                                        











  04/19/19 04/19/19 04/20/19





  08:18 10:52 03:28


 


WBC  11.9 H   8.9


 


RBC  2.11 L   2.52 L


 


Hgb  7.0 L   8.2 L


 


Hct  20.5 L   23.7 L


 


MCV  98 H   94  D


 


MCH  33.5 H   32.7


 


MCHC  34.3   34.7


 


RDW  14.2 H   16.7 H


 


Plt Count  220   180


 


Seg Neutrophils %    71.9


 


Lymphocytes %    19.8


 


Monocytes %    7.8


 


Eosinophils %    0.3


 


Basophils %    0.2


 


Absolute Neutrophils    6.4


 


Absolute Lymphocytes    1.8


 


Absolute Monocytes    0.7


 


Absolute Eosinophils    0.0


 


Absolute Basophils    0.0


 


Sodium   


 


Potassium   


 


Chloride   


 


Carbon Dioxide   


 


Anion Gap   


 


BUN   


 


Creatinine   


 


Est GFR ( Amer)   


 


Est GFR (Non-Af Amer)   


 


Glucose   


 


Calcium   


 


Magnesium   


 


Total Bilirubin   


 


AST   


 


ALT   


 


Alkaline Phosphatase   


 


Total Protein   


 


Albumin   


 


Blood Type   O POSITIVE 


 


Antibody Screen   NEGATIVE 














  04/20/19





  03:28


 


WBC 


 


RBC 


 


Hgb 


 


Hct 


 


MCV 


 


MCH 


 


MCHC 


 


RDW 


 


Plt Count 


 


Seg Neutrophils % 


 


Lymphocytes % 


 


Monocytes % 


 


Eosinophils % 


 


Basophils % 


 


Absolute Neutrophils 


 


Absolute Lymphocytes 


 


Absolute Monocytes 


 


Absolute Eosinophils 


 


Absolute Basophils 


 


Sodium  139.0


 


Potassium  4.2


 


Chloride  110 H


 


Carbon Dioxide  20 L


 


Anion Gap  9


 


BUN  16


 


Creatinine  0.99


 


Est GFR ( Amer)  > 60


 


Est GFR (Non-Af Amer)  > 60


 


Glucose  73 L


 


Calcium  8.3 L


 


Magnesium  2.0


 


Total Bilirubin  0.9


 


AST  22


 


ALT  17 L


 


Alkaline Phosphatase  70


 


Total Protein  5.9 L


 


Albumin  2.4 L


 


Blood Type 


 


Antibody Screen 














Assessment & Plan





- Plan Summary


Plan Summary: 





transfused 2 units prbc yesterday with appropriated resopnse


this am feels  well


awaiting physical theraypy


bedrest till then


cont abx

## 2019-04-21 LAB
ADD MANUAL DIFF: NO
ALBUMIN SERPL-MCNC: 2.5 G/DL (ref 3.5–5)
ALP SERPL-CCNC: 73 U/L (ref 38–126)
ALT SERPL-CCNC: 22 U/L (ref 21–72)
ANION GAP SERPL CALC-SCNC: 6 MMOL/L (ref 5–19)
AST SERPL-CCNC: 19 U/L (ref 17–59)
BASOPHILS # BLD AUTO: 0 10^3/UL (ref 0–0.2)
BASOPHILS NFR BLD AUTO: 0.5 % (ref 0–2)
BILIRUB DIRECT SERPL-MCNC: 0.3 MG/DL (ref 0–0.4)
BILIRUB SERPL-MCNC: 0.8 MG/DL (ref 0.2–1.3)
BUN SERPL-MCNC: 13 MG/DL (ref 7–20)
CALCIUM: 8.4 MG/DL (ref 8.4–10.2)
CHLORIDE SERPL-SCNC: 109 MMOL/L (ref 98–107)
CO2 SERPL-SCNC: 24 MMOL/L (ref 22–30)
EOSINOPHIL # BLD AUTO: 0.1 10^3/UL (ref 0–0.6)
EOSINOPHIL NFR BLD AUTO: 2 % (ref 0–6)
ERYTHROCYTE [DISTWIDTH] IN BLOOD BY AUTOMATED COUNT: 16.3 % (ref 11.5–14)
GLUCOSE SERPL-MCNC: 99 MG/DL (ref 75–110)
HCT VFR BLD CALC: 24.5 % (ref 37.9–51)
HGB BLD-MCNC: 8.7 G/DL (ref 13.5–17)
LYMPHOCYTES # BLD AUTO: 1.6 10^3/UL (ref 0.5–4.7)
LYMPHOCYTES NFR BLD AUTO: 21.7 % (ref 13–45)
MCH RBC QN AUTO: 33.2 PG (ref 27–33.4)
MCHC RBC AUTO-ENTMCNC: 35.5 G/DL (ref 32–36)
MCV RBC AUTO: 94 FL (ref 80–97)
MONOCYTES # BLD AUTO: 0.7 10^3/UL (ref 0.1–1.4)
MONOCYTES NFR BLD AUTO: 9.2 % (ref 3–13)
NEUTROPHILS # BLD AUTO: 5 10^3/UL (ref 1.7–8.2)
NEUTS SEG NFR BLD AUTO: 66.6 % (ref 42–78)
PLATELET # BLD: 185 10^3/UL (ref 150–450)
POTASSIUM SERPL-SCNC: 4.1 MMOL/L (ref 3.6–5)
PROT SERPL-MCNC: 6 G/DL (ref 6.3–8.2)
RBC # BLD AUTO: 2.61 10^6/UL (ref 4.35–5.55)
SODIUM SERPL-SCNC: 139.3 MMOL/L (ref 137–145)
TOTAL CELLS COUNTED % (AUTO): 100 %
WBC # BLD AUTO: 7.5 10^3/UL (ref 4–10.5)

## 2019-04-21 RX ADMIN — PIPERACILLIN AND TAZOBACTAM SCH MLS/HR: 3; .375 INJECTION, POWDER, LYOPHILIZED, FOR SOLUTION INTRAVENOUS; PARENTERAL at 00:18

## 2019-04-21 RX ADMIN — INSULIN HUMAN SCH: 100 INJECTION, SOLUTION PARENTERAL at 11:41

## 2019-04-21 RX ADMIN — INSULIN HUMAN SCH: 100 INJECTION, SOLUTION PARENTERAL at 21:38

## 2019-04-21 RX ADMIN — GABAPENTIN SCH MG: 300 CAPSULE ORAL at 15:48

## 2019-04-21 RX ADMIN — FOLIC ACID SCH MG: 1 TABLET ORAL at 10:24

## 2019-04-21 RX ADMIN — INSULIN HUMAN SCH: 100 INJECTION, SUSPENSION SUBCUTANEOUS at 17:27

## 2019-04-21 RX ADMIN — INSULIN HUMAN SCH: 100 INJECTION, SOLUTION PARENTERAL at 07:58

## 2019-04-21 RX ADMIN — PIPERACILLIN AND TAZOBACTAM SCH MLS/HR: 3; .375 INJECTION, POWDER, LYOPHILIZED, FOR SOLUTION INTRAVENOUS; PARENTERAL at 17:37

## 2019-04-21 RX ADMIN — PIPERACILLIN AND TAZOBACTAM SCH MLS/HR: 3; .375 INJECTION, POWDER, LYOPHILIZED, FOR SOLUTION INTRAVENOUS; PARENTERAL at 06:14

## 2019-04-21 RX ADMIN — HYDROCODONE BITARTRATE AND ACETAMINOPHEN PRN TAB: 10; 325 TABLET ORAL at 15:48

## 2019-04-21 RX ADMIN — FAMOTIDINE SCH MG: 20 TABLET, FILM COATED ORAL at 21:37

## 2019-04-21 RX ADMIN — INSULIN HUMAN SCH: 100 INJECTION, SOLUTION PARENTERAL at 17:27

## 2019-04-21 RX ADMIN — HYDROCODONE BITARTRATE AND ACETAMINOPHEN PRN TAB: 10; 325 TABLET ORAL at 10:25

## 2019-04-21 RX ADMIN — PIPERACILLIN AND TAZOBACTAM SCH MLS/HR: 3; .375 INJECTION, POWDER, LYOPHILIZED, FOR SOLUTION INTRAVENOUS; PARENTERAL at 23:14

## 2019-04-21 RX ADMIN — LISINOPRIL SCH MG: 5 TABLET ORAL at 10:24

## 2019-04-21 RX ADMIN — MULTIVITAMIN TABLET SCH TAB: TABLET at 10:24

## 2019-04-21 RX ADMIN — HYDROCODONE BITARTRATE AND ACETAMINOPHEN PRN TAB: 10; 325 TABLET ORAL at 21:37

## 2019-04-21 RX ADMIN — PIPERACILLIN AND TAZOBACTAM SCH MLS/HR: 3; .375 INJECTION, POWDER, LYOPHILIZED, FOR SOLUTION INTRAVENOUS; PARENTERAL at 11:55

## 2019-04-21 RX ADMIN — ATORVASTATIN CALCIUM SCH MG: 10 TABLET, FILM COATED ORAL at 21:37

## 2019-04-21 RX ADMIN — FAMOTIDINE SCH MG: 20 TABLET, FILM COATED ORAL at 10:24

## 2019-04-21 RX ADMIN — Medication SCH MG: at 10:25

## 2019-04-21 RX ADMIN — INSULIN HUMAN SCH UNIT: 100 INJECTION, SUSPENSION SUBCUTANEOUS at 10:25

## 2019-04-21 RX ADMIN — MORPHINE SULFATE PRN MG: 10 INJECTION INTRAMUSCULAR; INTRAVENOUS; SUBCUTANEOUS at 06:13

## 2019-04-21 RX ADMIN — GABAPENTIN SCH MG: 300 CAPSULE ORAL at 21:37

## 2019-04-21 NOTE — PDOC PROGRESS REPORT
Subjective


Progress Note for:: 04/21/19


Subjective:: 





38-year-old male with history of diabetes mellitus hypertension chronic renal 

insufficiency admitted for the left foot gangrene status post left BKA 

yesterday.  His hemoglobin dropped to 7.0 today.  Dr. Guan is planning to go

2 units of PRBC today.  Patient is still complaining of pain.  No acute events 

in the last 24 hours.  Patient is afebrile.





4/21/[id number]-knoc-old male with history of diabetes mellitus, hypertension 

admitted with the left foot gangrene.  Status post left BKA.  Status post 2 

units of blood transfusion during the hospital stay his hemoglobin today is 8.7.

 His blood sugar this morning 104.  Patient is expressing desire to go home.


Reason For Visit: 


DIABETIC FOOT INFECTION








Physical Exam


Vital Signs: 


                                        











Temp Pulse Resp BP Pulse Ox


 


 98.2 F   95   16   161/71 H  100 


 


 04/21/19 08:00  04/21/19 08:00  04/21/19 08:00  04/21/19 08:00  04/21/19 08:00








                                 Intake & Output











 04/20/19 04/21/19 04/22/19





 06:59 06:59 06:59


 


Intake Total 3760 1360 100


 


Output Total 2150 3800 


 


Balance 1610 -2440 100


 


Weight 74.9 kg 71.3 kg 











General appearance: PRESENT: no acute distress


Head exam: PRESENT: atraumatic


Eye exam: PRESENT: PERRLA


Mouth exam: PRESENT: moist, tongue midline


Neck exam: ABSENT: carotid bruit, JVD, lymphadenopathy, thyromegaly


Respiratory exam: PRESENT: clear to auscultation lorenzo.  ABSENT: rales, rhonchi, 

wheezes


Cardiovascular exam: PRESENT: RRR.  ABSENT: diastolic murmur, rubs, systolic 

murmur


GI/Abdominal exam: PRESENT: normal bowel sounds, soft.  ABSENT: distended, 

guarding, mass, organolmegaly, rebound, tenderness


Extremities exam: PRESENT: other - Left BKA


Neurological exam: PRESENT: alert, awake, oriented to person, oriented to place,

oriented to time, oriented to situation, CN II-XII grossly intact.  ABSENT: 

motor sensory deficit


Psychiatric exam: PRESENT: appropriate affect, normal mood.  ABSENT: homicidal 

ideation, suicidal ideation





Results


Laboratory Results: 


                                        





                                 04/21/19 03:30 





                                 04/21/19 03:30 





                                        











  04/21/19 04/21/19





  03:30 03:30


 


WBC  7.5 


 


RBC  2.61 L 


 


Hgb  8.7 L 


 


Hct  24.5 L 


 


MCV  94 


 


MCH  33.2 


 


MCHC  35.5 


 


RDW  16.3 H 


 


Plt Count  185 


 


Seg Neutrophils %  66.6 


 


Lymphocytes %  21.7 


 


Monocytes %  9.2 


 


Eosinophils %  2.0 


 


Basophils %  0.5 


 


Absolute Neutrophils  5.0 


 


Absolute Lymphocytes  1.6 


 


Absolute Monocytes  0.7 


 


Absolute Eosinophils  0.1 


 


Absolute Basophils  0.0 


 


Sodium   139.3


 


Potassium   4.1


 


Chloride   109 H


 


Carbon Dioxide   24


 


Anion Gap   6


 


BUN   13


 


Creatinine   1.08


 


Est GFR ( Amer)   > 60


 


Est GFR (Non-Af Amer)   > 60


 


Glucose   99


 


Calcium   8.4


 


Magnesium   1.8


 


Total Bilirubin   0.8


 


AST   19


 


ALT   22


 


Alkaline Phosphatase   73


 


Total Protein   6.0 L


 


Albumin   2.5 L














Assessment and Plan





- Diagnosis


(1) Left infected gangrenous foot


Is this a current diagnosis for this admission?: Yes   


Plan: 


4/18/2019-patient was admitted for left ankle and his foot status post BKA.  

Medical consult was called for management of diabetes.  Patient given the pain 

scale of 5 x 10 on morphine 4 mg IV every 4 as needed for pain.  Pain management

as per surgical team.  On Zosyn 3.375 g 6hrs





4/19/2019-status post left BKA postop day 2.  Hemoglobin dropped to 7.0 to give 

2 units of PRBC today.





4/21/2019-left BKA postop day 3.  Stable.  Hemoglobin is 8.7.  Patient has a 

low-grade fever of 99.8.  Presently on Zosyn plan is to continue present 

management.








(2) Type 2 DM with CKD and hypertension


Is this a current diagnosis for this admission?: No   


Plan: 


4/18/2019-patient has a history of type 2 diabetes mellitus in association with 

chronic kidney disease and hypertension.  Patient is noncompliant to medication.

 Presently on Lantus 10 units twice a day at home and insulin sliding scale 

plans to continue  the same treatment.  Started on diabetic diet dietary consult

was requested.





4/19/2019-patient has history of type 2 diabetes mellitus with CKD and 

hypertension.  His creatinine was improved to 1.00 today.  Acute on chronic r

enal insufficiency may be secondary to poor oral intake is resolved.





4/21/2019-patient has history of type 2 diabetes mellitus.  Patient's serum 

creatinine is 1.08 stable.  Plan is to decrease the insulin to 15 units twice a 

day.








(3) Hypertension


Qualifiers: 


   Hypertension type: essential hypertension   Qualified Code(s): I10 - Es

sential (primary) hypertension   


Is this a current diagnosis for this admission?: No   


Plan: 


4/18/2019-patient is given history of hypertension but not take any home 

medications.  Started on lisinopril 2.5 mg p.o. daily.  Latest blood pressure is

155/80.  He is receiving IV fluids at 125 cc/h.





4/19/2019-patient blood pressure today is 175/77.  On lisinopril 2.5 mg p.o. 

daily.  Also on IV fluids at 125 cc/h.  Plan is to decrease to IV fluids today. 

And increase lisinopril to 10 mg daily.





4/21/2019-patient blood pressure today is 153/72 on lisinopril 10 mg p.o. daily.

 off IV fluids.  Plan is to continue the present management.








(4) Anemia


Is this a current diagnosis for this admission?: No   


Plan: 


4/18/2019-patient's hemoglobin is 8.7.  Gradual decline in hemoglobin since 

February.  Anemia of chronic disease may be secondary to CKD.  Plan to repeat 

hemoglobin tomorrow if necessary plan to give 1 unit of PRBC.





4/19/2019-patient's hemoglobin dropped to 7.0 today.  To give 2 units of PRBC 

today.  To check posttransfusion CBC and CBC in the morning.





4/21/2019-patient's latest hemoglobin is 8.7 anemia of chronic disease most 

likely secondary to chronic kidney disease.








(5) Hyponatremia


Is this a current diagnosis for this admission?: Yes   


Plan: 


4/18/2019-patient's serum sodium is 128.9.  Hyper natremia most likely secondary

to heavy alcohol use.  Plan is to closely monitor the serum sodium levels.  

Currently is on IV fluids normal saline at 125 cc/h.





4/19/2019-admission serum sodium is 129 and it is improved to 134 today.  

Glucose is 219.  Corrected sodium is around 136.  hyponatremia due to poor oral 

intake and uncontrolled diabetes mellitus is resolving.





4/21/2019-patient serum sodium today is 139.  Hyponatremia is resolved.








(6) Alcoholism /alcohol abuse


Is this a current diagnosis for this admission?: Yes   





(7) CKD (chronic kidney disease)


Is this a current diagnosis for this admission?: Yes   


Plan: 


4/18/2019-patient's creatinine is 1.32.  GFR is more than 60.  Chronic CKD may 

be secondary to uncontrolled diabetes.





4/19/2019-patient's admission creatinine is 1.32 on the repeat creatinine today 

is 1.00.  Acute kidney injury most likely secondary to poor oral intake and 

uncontrolled diabetes mellitus is resolved.





4/21/2019-patient serum creatinine is 1.08 improved from 1.32 on admission.  

Acute kidney injury most likely secondary to poor oral intake.








- Time


Time Spent with patient: 15-24 minutes


Smoking Cessation Education: 3 to 10 minutes


Medications reviewed and adjusted accordingly: Yes


Anticipated discharge: Home with Homehealth

## 2019-04-21 NOTE — PDOC PROGRESS REPORT
Subjective


Progress Note for:: 04/21/19


Reason For Visit: 


DIABETIC FOOT INFECTION








Physical Exam


Vital Signs: 


                                        











Temp Pulse Resp BP Pulse Ox


 


 98.2 F   95   16   161/71 H  100 


 


 04/21/19 08:00  04/21/19 08:00  04/21/19 08:00  04/21/19 08:00  04/21/19 08:00








                                 Intake & Output











 04/20/19 04/21/19 04/22/19





 06:59 06:59 06:59


 


Intake Total 3760 1360 100


 


Output Total 2150 3800 


 


Balance 1610 -2440 100


 


Weight 74.9 kg 71.3 kg 














Results


Laboratory Results: 


                                        





                                 04/21/19 03:30 





                                 04/21/19 03:30 





                                        











  04/21/19 04/21/19





  03:30 03:30


 


WBC  7.5 


 


RBC  2.61 L 


 


Hgb  8.7 L 


 


Hct  24.5 L 


 


MCV  94 


 


MCH  33.2 


 


MCHC  35.5 


 


RDW  16.3 H 


 


Plt Count  185 


 


Seg Neutrophils %  66.6 


 


Lymphocytes %  21.7 


 


Monocytes %  9.2 


 


Eosinophils %  2.0 


 


Basophils %  0.5 


 


Absolute Neutrophils  5.0 


 


Absolute Lymphocytes  1.6 


 


Absolute Monocytes  0.7 


 


Absolute Eosinophils  0.1 


 


Absolute Basophils  0.0 


 


Sodium   139.3


 


Potassium   4.1


 


Chloride   109 H


 


Carbon Dioxide   24


 


Anion Gap   6


 


BUN   13


 


Creatinine   1.08


 


Est GFR ( Amer)   > 60


 


Est GFR (Non-Af Amer)   > 60


 


Glucose   99


 


Calcium   8.4


 


Magnesium   1.8


 


Total Bilirubin   0.8


 


AST   19


 


ALT   22


 


Alkaline Phosphatase   73


 


Total Protein   6.0 L


 


Albumin   2.5 L














Assessment & Plan





- Diagnosis


(1) Osteomyelitis of left foot


Qualifiers: 


   Osteomyelitis type: other acute   Qualified Code(s): M86.172 - Other acute 

osteomyelitis, left ankle and foot   


Is this a current diagnosis for this admission?: Yes   





(2) Uncontrolled diabetes mellitus


Qualifiers: 


   Diabetes mellitus type: type 2 


Is this a current diagnosis for this admission?: Yes   





- Plan Summary


Plan Summary: 





This is a 48-year-old male with a severe left diabetic foot infection.  He is 

status post left below-knee amputation.  The knee immobilizer and dressing are 

in place.  There is no soiling.  The patient has no complaints today.  Continue 

with knee immobilizer and pain control.  Anticipate discharge in the next 24-48 

hours.

## 2019-04-22 VITALS — DIASTOLIC BLOOD PRESSURE: 79 MMHG | SYSTOLIC BLOOD PRESSURE: 146 MMHG

## 2019-04-22 LAB
ADD MANUAL DIFF: NO
ALBUMIN SERPL-MCNC: 2.7 G/DL (ref 3.5–5)
ALP SERPL-CCNC: 72 U/L (ref 38–126)
ALT SERPL-CCNC: 20 U/L (ref 21–72)
ANION GAP SERPL CALC-SCNC: 8 MMOL/L (ref 5–19)
AST SERPL-CCNC: 17 U/L (ref 17–59)
BASOPHILS # BLD AUTO: 0 10^3/UL (ref 0–0.2)
BASOPHILS NFR BLD AUTO: 0.4 % (ref 0–2)
BILIRUB DIRECT SERPL-MCNC: 0.4 MG/DL (ref 0–0.4)
BILIRUB SERPL-MCNC: 0.8 MG/DL (ref 0.2–1.3)
BUN SERPL-MCNC: 13 MG/DL (ref 7–20)
CALCIUM: 8.7 MG/DL (ref 8.4–10.2)
CHLORIDE SERPL-SCNC: 108 MMOL/L (ref 98–107)
CO2 SERPL-SCNC: 23 MMOL/L (ref 22–30)
EOSINOPHIL # BLD AUTO: 0.2 10^3/UL (ref 0–0.6)
EOSINOPHIL NFR BLD AUTO: 3 % (ref 0–6)
ERYTHROCYTE [DISTWIDTH] IN BLOOD BY AUTOMATED COUNT: 16 % (ref 11.5–14)
GLUCOSE SERPL-MCNC: 156 MG/DL (ref 75–110)
HCT VFR BLD CALC: 28.2 % (ref 37.9–51)
HGB BLD-MCNC: 9.6 G/DL (ref 13.5–17)
LYMPHOCYTES # BLD AUTO: 1.1 10^3/UL (ref 0.5–4.7)
LYMPHOCYTES NFR BLD AUTO: 15.5 % (ref 13–45)
MCH RBC QN AUTO: 32.2 PG (ref 27–33.4)
MCHC RBC AUTO-ENTMCNC: 34.1 G/DL (ref 32–36)
MCV RBC AUTO: 94 FL (ref 80–97)
MONOCYTES # BLD AUTO: 0.4 10^3/UL (ref 0.1–1.4)
MONOCYTES NFR BLD AUTO: 6.1 % (ref 3–13)
NEUTROPHILS # BLD AUTO: 5.5 10^3/UL (ref 1.7–8.2)
NEUTS SEG NFR BLD AUTO: 75 % (ref 42–78)
PLATELET # BLD: 223 10^3/UL (ref 150–450)
POTASSIUM SERPL-SCNC: 4.1 MMOL/L (ref 3.6–5)
PROT SERPL-MCNC: 6.4 G/DL (ref 6.3–8.2)
RBC # BLD AUTO: 2.98 10^6/UL (ref 4.35–5.55)
SODIUM SERPL-SCNC: 139.2 MMOL/L (ref 137–145)
TOTAL CELLS COUNTED % (AUTO): 100 %
WBC # BLD AUTO: 7.4 10^3/UL (ref 4–10.5)

## 2019-04-22 RX ADMIN — INSULIN HUMAN SCH: 100 INJECTION, SOLUTION PARENTERAL at 17:03

## 2019-04-22 RX ADMIN — FAMOTIDINE SCH MG: 20 TABLET, FILM COATED ORAL at 09:22

## 2019-04-22 RX ADMIN — HYDROCODONE BITARTRATE AND ACETAMINOPHEN PRN TAB: 10; 325 TABLET ORAL at 06:23

## 2019-04-22 RX ADMIN — HYDROCODONE BITARTRATE AND ACETAMINOPHEN PRN TAB: 10; 325 TABLET ORAL at 11:17

## 2019-04-22 RX ADMIN — INSULIN HUMAN SCH: 100 INJECTION, SOLUTION PARENTERAL at 08:06

## 2019-04-22 RX ADMIN — FOLIC ACID SCH MG: 1 TABLET ORAL at 09:22

## 2019-04-22 RX ADMIN — INSULIN HUMAN SCH: 100 INJECTION, SOLUTION PARENTERAL at 12:22

## 2019-04-22 RX ADMIN — PIPERACILLIN AND TAZOBACTAM SCH MLS/HR: 3; .375 INJECTION, POWDER, LYOPHILIZED, FOR SOLUTION INTRAVENOUS; PARENTERAL at 13:21

## 2019-04-22 RX ADMIN — HYDROCODONE BITARTRATE AND ACETAMINOPHEN PRN TAB: 10; 325 TABLET ORAL at 18:53

## 2019-04-22 RX ADMIN — INSULIN HUMAN SCH: 100 INJECTION, SUSPENSION SUBCUTANEOUS at 17:25

## 2019-04-22 RX ADMIN — LISINOPRIL SCH MG: 5 TABLET ORAL at 09:22

## 2019-04-22 RX ADMIN — PIPERACILLIN AND TAZOBACTAM SCH: 3; .375 INJECTION, POWDER, LYOPHILIZED, FOR SOLUTION INTRAVENOUS; PARENTERAL at 17:26

## 2019-04-22 RX ADMIN — PIPERACILLIN AND TAZOBACTAM SCH MLS/HR: 3; .375 INJECTION, POWDER, LYOPHILIZED, FOR SOLUTION INTRAVENOUS; PARENTERAL at 05:24

## 2019-04-22 RX ADMIN — MULTIVITAMIN TABLET SCH TAB: TABLET at 09:22

## 2019-04-22 RX ADMIN — INSULIN HUMAN SCH UNIT: 100 INJECTION, SUSPENSION SUBCUTANEOUS at 09:22

## 2019-04-22 RX ADMIN — GABAPENTIN SCH MG: 300 CAPSULE ORAL at 05:24

## 2019-04-22 RX ADMIN — Medication SCH MG: at 11:18

## 2019-04-22 RX ADMIN — GABAPENTIN SCH MG: 300 CAPSULE ORAL at 13:21

## 2019-04-22 NOTE — PDOC PROGRESS REPORT
Subjective


Progress Note for:: 04/22/19


Subjective:: 





38-year-old male with history of diabetes mellitus hypertension chronic renal 

insufficiency admitted for the left foot gangrene status post left BKA 

yesterday.  His hemoglobin dropped to 7.0 today.  Dr. Guan is planning to go

2 units of PRBC today.  Patient is still complaining of pain.  No acute events 

in the last 24 hours.  Patient is afebrile.





4/21/[id number]-rmwl-old male with history of diabetes mellitus, hypertension 

admitted with the left foot gangrene.  Status post left BKA.  Status post 2 

units of blood transfusion during the hospital stay his hemoglobin today is 8.7.

 His blood sugar this morning 104.  Patient is expressing desire to go home.





4/22/2019-48 years old male with h/o diabetes , htn admitted after lt bka 

.medical consult was requested for management of diabetes and htn.  Patient is 

doing well blood sugars are well controlled blood pressure is moderately 

controlled today.  he is going home today as per the surgical team.  Patient is 

comfortable in the bed denies any complaints.


Reason For Visit: 


DIABETIC FOOT INFECTION








Physical Exam


Vital Signs: 


                                        











Temp Pulse Resp BP Pulse Ox


 


 98.6 F   80   16   150/76 H  100 


 


 04/21/19 23:42  04/21/19 23:42  04/21/19 23:42  04/21/19 23:42  04/21/19 23:42








                                 Intake & Output











 04/21/19 04/22/19 04/23/19





 06:59 06:59 06:59


 


Intake Total 1360 1980 


 


Output Total 3800 950 


 


Balance -2440 1030 


 


Weight 71.3 kg 71.3 kg 











General appearance: PRESENT: no acute distress, obese


Head exam: PRESENT: atraumatic


Eye exam: PRESENT: PERRLA


Neck exam: ABSENT: carotid bruit, JVD, lymphadenopathy, thyromegaly


Respiratory exam: PRESENT: clear to auscultation lorenzo.  ABSENT: rales, rhonchi, 

wheezes


Cardiovascular exam: PRESENT: RRR.  ABSENT: diastolic murmur, rubs, systolic 

murmur


GI/Abdominal exam: PRESENT: normal bowel sounds, soft.  ABSENT: distended, 

guarding, mass, organolmegaly, rebound, tenderness


Rectal exam: PRESENT: deferred


Extremities exam: PRESENT: other - Left BKA


Neurological exam: PRESENT: alert, awake, oriented to person, oriented to place,

oriented to time, oriented to situation, CN II-XII grossly intact.  ABSENT: 

motor sensory deficit


Psychiatric exam: PRESENT: appropriate affect, normal mood.  ABSENT: homicidal 

ideation, suicidal ideation


Skin exam: PRESENT: dry, intact, warm.  ABSENT: cyanosis, rash





Results


Laboratory Results: 


                                        





                                 04/21/19 03:30 





                                 04/21/19 03:30 











Assessment and Plan





- Diagnosis


(1) Left infected gangrenous foot


Is this a current diagnosis for this admission?: Yes   


Plan: 


4/18/2019-patient was admitted for left ankle and his foot status post BKA.  

Medical consult was called for management of diabetes.  Patient given the pain 

scale of 5 x 10 on morphine 4 mg IV every 4 as needed for pain.  Pain management

as per surgical team.  On Zosyn 3.375 g 6hrs





4/19/2019-status post left BKA postop day 2.  Hemoglobin dropped to 7.0 to give 

2 units of PRBC today.





4/21/2019-left BKA postop day 3.  Stable.  Hemoglobin is 8.7.  Patient has a low

-grade fever of 99.8.  Presently on Zosyn plan is to continue present 

management.





4/22/2019-status post left BKA postop day 4.  Patient is doing well.  As per the

surgical team patient is going home today.








(2) Type 2 DM with CKD and hypertension


Is this a current diagnosis for this admission?: No   


Plan: 


4/18/2019-patient has a history of type 2 diabetes mellitus in association with 

chronic kidney disease and hypertension.  Patient is noncompliant to medication.

 Presently on Lantus 10 units twice a day at home and insulin sliding scale 

plans to continue  the same treatment.  Started on diabetic diet dietary consult

was requested.





4/19/2019-patient has history of type 2 diabetes mellitus with CKD and 

hypertension.  His creatinine was improved to 1.00 today.  Acute on chronic 

renal insufficiency may be secondary to poor oral intake is resolved.





4/21/2019-patient has history of type 2 diabetes mellitus.  Patient's serum 

creatinine is 1.08 stable.  Plan is to decrease the insulin to 15 units twice a 

day.





4/22/2019-patient has history of type 2 diabetes mellitus.  Hemoglobin A1c is 

7.3.  Blood sugar this morning is 107.  Patient states he has enough diabetic 

supplies at home does not want any prescriptions.  Patient is advised to follow-

up with primary care physician for diabetes management in 3 to 5 days.








(3) Hypertension


Qualifiers: 


   Hypertension type: essential hypertension   Qualified Code(s): I10 - 

Essential (primary) hypertension   


Is this a current diagnosis for this admission?: No   


Plan: 


4/18/2019-patient is given history of hypertension but not take any home 

medications.  Started on lisinopril 2.5 mg p.o. daily.  Latest blood pressure is

155/80.  He is receiving IV fluids at 125 cc/h.





4/19/2019-patient blood pressure today is 175/77.  On lisinopril 2.5 mg p.o. 

daily.  Also on IV fluids at 125 cc/h.  Plan is to decrease to IV fluids today. 

And increase lisinopril to 10 mg daily.





4/21/2019-patient blood pressure today is 153/72 on lisinopril 10 mg p.o. daily.

 off IV fluids.  Plan is to continue the present management.





4/22/2019-patient blood pressure today is 150/76.  Presently on lisinopril 10 mg

p.o. daily patient says he has prescriptions at home.








(4) Anemia


Is this a current diagnosis for this admission?: No   


Plan: 


4/18/2019-patient's hemoglobin is 8.7.  Gradual decline in hemoglobin since 

February.  Anemia of chronic disease may be secondary to CKD.  Plan to repeat 

hemoglobin tomorrow if necessary plan to give 1 unit of PRBC.





4/19/2019-patient's hemoglobin dropped to 7.0 today.  To give 2 units of PRBC 

today.  To check posttransfusion CBC and CBC in the morning.





4/21/2019-patient's latest hemoglobin is 8.7 anemia of chronic disease most 

likely secondary to chronic kidney disease.





4/22/2019-patient hemoglobin is 8.7 has anemia of chronic disease.  He strongly 

advised to follow-up with primary care physician for further management.








(5) Hyponatremia


Is this a current diagnosis for this admission?: Yes   


Plan: 


4/18/2019-patient's serum sodium is 128.9.  Hyper natremia most likely secondary

to heavy alcohol use.  Plan is to closely monitor the serum sodium levels.  

Currently is on IV fluids normal saline at 125 cc/h.





4/19/2019-admission serum sodium is 129 and it is improved to 134 today.  

Glucose is 219.  Corrected sodium is around 136.  hyponatremia due to poor oral 

intake and uncontrolled diabetes mellitus is resolving.





4/21/2019-patient serum sodium today is 139.  Hyponatremia is resolved.





4/22/2019 latest serum sodium level is 139 and the hyponatremia is resolved.








(6) Alcoholism /alcohol abuse


Is this a current diagnosis for this admission?: Yes   





(7) CKD (chronic kidney disease)


Is this a current diagnosis for this admission?: Yes   





- Time


Time Spent with patient: 15-24 minutes


Medications reviewed and adjusted accordingly: Yes


Anticipated discharge: Home with Homehealth

## 2019-04-22 NOTE — PDOC PROGRESS REPORT
Subjective


Progress Note for:: 04/22/19


Subjective:: 





feels well


[place] diet


now able to ambulate with wheeled walker


Reason For Visit: 


DIABETIC FOOT INFECTION








Physical Exam


Vital Signs: 


                                        











Temp Pulse Resp BP Pulse Ox


 


 98.6 F   80   16   150/76 H  100 


 


 04/21/19 23:42  04/21/19 23:42  04/21/19 23:42  04/21/19 23:42  04/21/19 23:42








                                 Intake & Output











 04/21/19 04/22/19 04/23/19





 06:59 06:59 06:59


 


Intake Total 1360 1980 


 


Output Total 3800 950 


 


Balance -2440 1030 


 


Weight 71.3 kg 71.3 kg 











General appearance: PRESENT: no acute distress


Head exam: PRESENT: normocephalic


Eye exam: PRESENT: EOMI


Mouth exam: PRESENT: moist, neck supple


Respiratory exam: PRESENT: clear to auscultation lorenzo


Cardiovascular exam: PRESENT: RRR


Vascular exam: PRESENT: normal capillary refill


GI/Abdominal exam: PRESENT: soft


Rectal exam: PRESENT: deferred


Extremities exam: PRESENT: other - amputation site left bka, looks clean, well 

perfused. healing.


Musculoskeletal exam: PRESENT: ambulatory


Neurological exam: PRESENT: alert


Psychiatric exam: PRESENT: appropriate affect


Skin exam: PRESENT: dry





Results


Laboratory Results: 


                                        





                                 04/21/19 03:30 





                                 04/21/19 03:30 











Assessment & Plan





- Plan Summary


Plan Summary: 





s/p left bka


doing well


has worked with pt and now able to


walk iwth walker


wound clean dry


pt could be discharged home after


seen by discharge planning and


arrangements made for 


rehab and home equipment.

## 2019-04-23 NOTE — DISCHARGE SUMMARY E
Discharge Summary



NAME: BRIANDA SNOW

MRN:  [id number]        : 1970     AGE: 48Y

ADMITTED: 2019                  DISCHARGED: 2019



ADMISSION DIAGNOSIS:

Gangrene, left foot.



DISCHARGE DIAGNOSIS:

Gangrene, left foot.



OPERATIVE PROCEDURE:

Left below-the-knee amputation.



SURGEON:

Scotty Shepard M.D.



REASON FOR HOSPITALIZATION/HOSPITAL COURSE:

This is a 48-year-old male with type 2 diabetes, hypertension, alcohol

abuse, who was admitted for an elective left below-the-knee amputation. 

He had previously undergone a transmetatarsal amputation of the left foot,

which has failed with chronic infected stump.  He has consented to a left

below-the-knee amputation.  He was admitted on the day of surgery on

2019 and he underwent an uncomplicated left below-the-knee

amputation.  Postoperatively, he had a routine postop course.  Dr. Vanessa

from [facility] medicine also followed along with the patient secondary to

management of his diabetes.  He also had a drop in hemoglobin after his

surgery and received 2 units of packed cells on postop day 2.  He

continued to improve throughout the hospital course.  His wound was

checked every day and it was healing appropriately.  He was advanced to a

diabetic diet on postop day one.  On postop day 2, he was comfortable with

no pain, tolerating a diet.  His sugars were controlled with IV insulin. 

Dr. Vanessa continued to follow his diabetes in the hospital, and by

postoperative day 4, he was afebrile with stable vital signs.  His BKA

stump was healing well.  He was tolerating a regular diet.  His hemoglobin

and hematocrit remained stable.  The patient was ready to go home on

postop day 4.  He has his own medications for pain at home, which he

received during his last hospital stay as well as his insulin, which he

has been using at home on a sliding scale.  He was discharged home on

 and he will be given a followup appointment with me in 1 week after

discharge.



FINAL DIAGNOSIS:

Gangrene, left foot secondary to type 2 diabetes.





DICTATING PHYSICIAN:  SCOTTY SHEPARD M.D.





[id number]                  DT: 2019    0840

PHY#: [id number]            DD: 2019    0723

ID:   [id number]           JOB#: [id number]       ACCT: [id number]



cc:SCOTTY SHEPARD M.D.

>

## 2021-04-01 NOTE — PDOC PROGRESS REPORT
Routing to [place] - please see below. Thank you   Subjective


Progress Note for:: 04/19/19


Subjective:: 





38-year-old male with history of diabetes mellitus hypertension chronic renal 

insufficiency admitted for the left foot gangrene status post left BKA 

yesterday.  His hemoglobin dropped to 7.0 today.  Dr. Guan is planning to go

2 units of PRBC today.  Patient is still complaining of pain.  No acute events 

in the last 24 hours.  Patient is afebrile.


Reason For Visit: 


DIABETIC FOOT INFECTION








Physical Exam


Vital Signs: 


                                        











Temp Pulse Resp BP Pulse Ox


 


 98.0 F   106 H  18   175/77 H  100 


 


 04/19/19 08:14  04/19/19 08:14  04/19/19 08:14  04/19/19 08:14  04/19/19 08:14








                                 Intake & Output











 04/18/19 04/19/19 04/20/19





 06:59 06:59 06:59


 


Intake Total  5150 


 


Output Total  3675 


 


Balance  1475 


 


Weight 75 kg 74.3 kg 











General appearance: PRESENT: mild distress, obese


Head exam: PRESENT: atraumatic


Eye exam: PRESENT: PERRLA


Mouth exam: PRESENT: moist, tongue midline


Neck exam: ABSENT: carotid bruit, JVD, lymphadenopathy, thyromegaly


Respiratory exam: PRESENT: decreased breath sounds


Cardiovascular exam: PRESENT: RRR.  ABSENT: diastolic murmur, rubs, systolic 

murmur


GI/Abdominal exam: PRESENT: normal bowel sounds, soft.  ABSENT: distended, 

guarding, mass, organolmegaly, rebound, tenderness


Extremities exam: PRESENT: other - Left BKA


Neurological exam: PRESENT: alert, awake, oriented to person, oriented to place,

oriented to time, oriented to situation, CN II-XII grossly intact.  ABSENT: 

motor sensory deficit


Psychiatric exam: PRESENT: appropriate affect, normal mood.  ABSENT: homicidal 

ideation, suicidal ideation





Results


Laboratory Results: 


                                        





                                 04/19/19 08:18 





                                 04/19/19 05:30 





                                        











  04/18/19 04/19/19 04/19/19





  08:30 05:30 05:30


 


WBC   10.9 H 


 


RBC   2.06 L 


 


Hgb   7.0 L 


 


Hct   20.3 L 


 


MCV   99 H 


 


MCH   33.8 H 


 


MCHC   34.3 


 


RDW   14.3 H 


 


Plt Count   179 


 


Seg Neutrophils %   88.9 H 


 


Lymphocytes %   5.9 L 


 


Monocytes %   5.2 


 


Eosinophils %   0.0 


 


Basophils %   0.0 


 


Absolute Neutrophils   9.7 H 


 


Absolute Lymphocytes   0.6 


 


Absolute Monocytes   0.6 


 


Absolute Eosinophils   0.0 


 


Absolute Basophils   0.0 


 


Sodium  128.9 L   133.9 L


 


Potassium  5.0   4.8


 


Chloride  100   108 H


 


Carbon Dioxide  20 L   19 L


 


Anion Gap  9   7


 


BUN  24 H   22 H


 


Creatinine  1.32 H   1.00


 


Est GFR ( Amer)  > 60   > 60


 


Est GFR (Non-Af Amer)  58 L   > 60


 


Glucose  150 H   195 H


 


Calcium  8.8   8.5


 


Magnesium    2.0


 


Total Bilirubin    0.5


 


AST    21


 


ALT    12 L


 


Alkaline Phosphatase    70


 


Total Protein    6.0 L


 


Albumin    2.5 L


 


Triglycerides    55


 


Cholesterol    124.34


 


LDL Cholesterol Direct    67


 


VLDL Cholesterol    11.0


 


HDL Cholesterol    40














  04/19/19





  08:18


 


WBC  11.9 H


 


RBC  2.11 L


 


Hgb  7.0 L


 


Hct  20.5 L


 


MCV  98 H


 


MCH  33.5 H


 


MCHC  34.3


 


RDW  14.2 H


 


Plt Count  220


 


Seg Neutrophils % 


 


Lymphocytes % 


 


Monocytes % 


 


Eosinophils % 


 


Basophils % 


 


Absolute Neutrophils 


 


Absolute Lymphocytes 


 


Absolute Monocytes 


 


Absolute Eosinophils 


 


Absolute Basophils 


 


Sodium 


 


Potassium 


 


Chloride 


 


Carbon Dioxide 


 


Anion Gap 


 


BUN 


 


Creatinine 


 


Est GFR ( Amer) 


 


Est GFR (Non-Af Amer) 


 


Glucose 


 


Calcium 


 


Magnesium 


 


Total Bilirubin 


 


AST 


 


ALT 


 


Alkaline Phosphatase 


 


Total Protein 


 


Albumin 


 


Triglycerides 


 


Cholesterol 


 


LDL Cholesterol Direct 


 


VLDL Cholesterol 


 


HDL Cholesterol 














Assessment and Plan





- Diagnosis


(1) Left infected gangrenous foot


Is this a current diagnosis for this admission?: Yes   


Plan: 


4/18/2019-patient was admitted for left ankle and his foot status post BKA.  

Medical consult was called for management of diabetes.  Patient given the pain 

scale of 5 x 10 on morphine 4 mg IV every 4 as needed for pain.  Pain management

as per surgical team.  On Zosyn 3.375 g 6hrs





4/19/2019-status post left BKA postop day 2.  Hemoglobin dropped to 7.0 to give 

2 units of PRBC today.








(2) Type 2 DM with CKD and hypertension


Is this a current diagnosis for this admission?: No   


Plan: 


4/18/2019-patient has a history of type 2 diabetes mellitus in association with 

chronic kidney disease and hypertension.  Patient is noncompliant to medication.

 Presently on Lantus 10 units twice a day at home and insulin sliding scale 

plans to continue  the same treatment.  Started on diabetic diet dietary consult

was requested.





4/19/2019-patient has history of type 2 diabetes mellitus with CKD and 

hypertension.  His creatinine was improved to 1.00 today.  Acute on chronic 

renal insufficiency may be secondary to poor oral intake is resolved.








(3) Hypertension


Qualifiers: 


   Hypertension type: essential hypertension   Qualified Code(s): I10 - 

Essential (primary) hypertension   


Is this a current diagnosis for this admission?: No   


Plan: 


4/18/2019-patient is given history of hypertension but not take any home medic

ations.  Started on lisinopril 2.5 mg p.o. daily.  Latest blood pressure is 

155/80.  He is receiving IV fluids at 125 cc/h.





4/19/2019-patient blood pressure today is 175/77.  On lisinopril 2.5 mg p.o. 

daily.  Also on IV fluids at 125 cc/h.  Plan is to decrease to IV fluids today. 

And increase lisinopril to 10 mg daily.








(4) Anemia


Is this a current diagnosis for this admission?: No   


Plan: 


4/18/2019-patient's hemoglobin is 8.7.  Gradual decline in hemoglobin since 

February.  Anemia of chronic disease may be secondary to CKD.  Plan to repeat 

hemoglobin tomorrow if necessary plan to give 1 unit of PRBC.





4/19/2019-patient's hemoglobin dropped to 7.0 today.  To give 2 units of PRBC 

today.  To check posttransfusion CBC and CBC in the morning.








(5) Hyponatremia


Is this a current diagnosis for this admission?: Yes   


Plan: 


4/18/2019-patient's serum sodium is 128.9.  Hyper natremia most likely secondary

to heavy alcohol use.  Plan is to closely monitor the serum sodium levels.  

Currently is on IV fluids normal saline at 125 cc/h.





4/19/2019-admission serum sodium is 129 and it is improved to 134 today.  

Glucose is 219.  Corrected sodium is around 136.  hyponatremia due to poor oral 

intake and uncontrolled diabetes mellitus is resolving.








(6) Alcoholism /alcohol abuse


Is this a current diagnosis for this admission?: Yes   


Plan: 


4/18/2019-patient history of heavy alcohol use.  To start him on Ativan 1 mg IV 

every 4 as needed to watch for the DTs.





4/19/2019-patient has history of heavy alcohol use.  He is on Ativan 1 mg IV 

every 4 as needed for agitation and watch for the DTs.








(7) CKD (chronic kidney disease)


Is this a current diagnosis for this admission?: Yes   


Plan: 


4/18/2019-patient's creatinine is 1.32.  GFR is more than 60.  Chronic CKD may 

be secondary to uncontrolled diabetes.





4/19/2019-patient's admission creatinine is 1.32 on the repeat creatinine today 

is 1.00.  Acute kidney injury most likely secondary to poor oral intake and 

uncontrolled diabetes mellitus is resolved.








- Time


Time Spent with patient: 15-24 minutes


Medications reviewed and adjusted accordingly: Yes


Anticipated discharge: Home with Homehealth

## 2023-08-20 NOTE — OPERATIVE REPORT
Left wrist incision/wound noted to have large amount of purulent drainage. (previously noted as serosanginous) No WHALEN updated and will come to assess wound tomorrow AM.    Nonrecallable Operative Report


DATE OF SURGERY: 03/24/19


PREOPERATIVE DIAGNOSIS: left diabetic foot infection


POSTOPERATIVE DIAGNOSIS: left diabetic foot infection


OPERATION: transmetatarsal foot amputation left foot


SURGEON: BACILIO SHEPARD


ANESTHESIA: Spinal


TISSUE REMOVED OR ALTERED: forefoot


COMPLICATIONS: 





none


ESTIMATED BLOOD LOSS: 25cc


INTRAOPERATIVE FINDINGS: see dictation


PROCEDURE: 





see dictation